# Patient Record
Sex: MALE | Race: WHITE | Employment: FULL TIME | URBAN - METROPOLITAN AREA
[De-identification: names, ages, dates, MRNs, and addresses within clinical notes are randomized per-mention and may not be internally consistent; named-entity substitution may affect disease eponyms.]

---

## 2017-01-26 ENCOUNTER — HOSPITAL ENCOUNTER (OUTPATIENT)
Dept: LAB | Age: 42
Discharge: HOME | End: 2017-01-26

## 2017-01-26 ENCOUNTER — LAB ONLY (OUTPATIENT)
Dept: INTERNAL MEDICINE CLINIC | Age: 42
End: 2017-01-26

## 2017-01-26 DIAGNOSIS — Z00.00 ROUTINE GENERAL MEDICAL EXAMINATION AT A HEALTH CARE FACILITY: Primary | ICD-10-CM

## 2017-01-26 PROCEDURE — 99001 SPECIMEN HANDLING PT-LAB: CPT | Performed by: INTERNAL MEDICINE

## 2017-01-27 LAB
ALBUMIN SERPL-MCNC: 4.4 G/DL (ref 3.5–5.5)
ALBUMIN/GLOB SERPL: 1.6 {RATIO} (ref 1.1–2.5)
ALP SERPL-CCNC: 78 IU/L (ref 39–117)
ALT SERPL-CCNC: 13 IU/L (ref 0–44)
AST SERPL-CCNC: 18 IU/L (ref 0–40)
BASOPHILS # BLD AUTO: 0 X10E3/UL (ref 0–0.2)
BASOPHILS NFR BLD AUTO: 0 %
BILIRUB SERPL-MCNC: 0.3 MG/DL (ref 0–1.2)
BUN SERPL-MCNC: 14 MG/DL (ref 6–20)
BUN/CREAT SERPL: 19 (ref 8–19)
CALCIUM SERPL-MCNC: 9.5 MG/DL (ref 8.7–10.2)
CHLORIDE SERPL-SCNC: 101 MMOL/L (ref 96–106)
CHOLEST SERPL-MCNC: 210 MG/DL (ref 100–199)
CO2 SERPL-SCNC: 25 MMOL/L (ref 18–29)
CREAT SERPL-MCNC: 0.75 MG/DL (ref 0.76–1.27)
EOSINOPHIL # BLD AUTO: 0.3 X10E3/UL (ref 0–0.4)
EOSINOPHIL NFR BLD AUTO: 5 %
ERYTHROCYTE [DISTWIDTH] IN BLOOD BY AUTOMATED COUNT: 13.4 % (ref 12.3–15.4)
GLOBULIN SER CALC-MCNC: 2.7 G/DL (ref 1.5–4.5)
GLUCOSE SERPL-MCNC: 93 MG/DL (ref 65–99)
HCT VFR BLD AUTO: 42.7 % (ref 37.5–51)
HDLC SERPL-MCNC: 45 MG/DL
HGB BLD-MCNC: 14.3 G/DL (ref 12.6–17.7)
IMM GRANULOCYTES # BLD: 0 X10E3/UL (ref 0–0.1)
IMM GRANULOCYTES NFR BLD: 0 %
INTERPRETATION, 910389: NORMAL
LDLC SERPL CALC-MCNC: 134 MG/DL (ref 0–99)
LYMPHOCYTES # BLD AUTO: 1.9 X10E3/UL (ref 0.7–3.1)
LYMPHOCYTES NFR BLD AUTO: 33 %
MCH RBC QN AUTO: 29.5 PG (ref 26.6–33)
MCHC RBC AUTO-ENTMCNC: 33.5 G/DL (ref 31.5–35.7)
MCV RBC AUTO: 88 FL (ref 79–97)
MONOCYTES # BLD AUTO: 0.6 X10E3/UL (ref 0.1–0.9)
MONOCYTES NFR BLD AUTO: 10 %
NEUTROPHILS # BLD AUTO: 3 X10E3/UL (ref 1.4–7)
NEUTROPHILS NFR BLD AUTO: 52 %
PLATELET # BLD AUTO: 259 X10E3/UL (ref 150–379)
POTASSIUM SERPL-SCNC: 4.4 MMOL/L (ref 3.5–5.2)
PROT SERPL-MCNC: 7.1 G/DL (ref 6–8.5)
RBC # BLD AUTO: 4.84 X10E6/UL (ref 4.14–5.8)
SODIUM SERPL-SCNC: 140 MMOL/L (ref 134–144)
TRIGL SERPL-MCNC: 155 MG/DL (ref 0–149)
TSH SERPL DL<=0.005 MIU/L-ACNC: 2.06 UIU/ML (ref 0.45–4.5)
VLDLC SERPL CALC-MCNC: 31 MG/DL (ref 5–40)
WBC # BLD AUTO: 5.8 X10E3/UL (ref 3.4–10.8)

## 2017-02-02 NOTE — PROGRESS NOTES
46 y.o. white male who presents for RPE    He seems to be doing very well. The prozac continues to control his sx    No cardiovascular complaints. Not exercising much mainly due to busy life although they did join the [facility] a few months back    No gi or gu issues. ppi is controlling the sx    Past Medical History   Diagnosis Date    Allergic rhinitis     Anxiety and depression     Cervical spine disease 2009     Dr. Afia Haley 2009; MRI showed C5-7 disc extrusion/spur complex w mild spinal stenosis    FHx: colon cancer     GERD with esophagitis 01/2015     Dr Samuel Jarvis EGD    History of echocardiogram 04/06/2012     Tech difficult. EF 60-65%. No RWMA. RVSP 15-20 mmHg.  Hyperlipidemia     Reactive hypoglycemia      5/14 3 hr GTT 40    Treadmill stress test  07/08/2011     Negative maximal stress test.  Ex time 13:00.      Past Surgical History   Procedure Laterality Date    Hx gi  11/10     neg ct abd/pelvis    Hx appendectomy  1/14     Dr Deepika Stephen    Pr cardiac surg procedure unlist  2014     tilt table negative Dr Anton Greenberg Pr cardiac surg procedure unlist  5/14     stress echo negative    Pr neurological procedure unlisted  8/14     EEG negative Dr Fatemeh Aguiar Pr neurological procedure unlisted  11/13     CT head negative    Hx colonoscopy       Dr Samuel Jarvis 1/9/15 negative     Social History     Social History    Marital status:      Spouse name: N/A    Number of children: 3    Years of education: N/A     Occupational History    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[address] [employer]     Social History Main Topics    Smoking status: Never Smoker    Smokeless tobacco: Never Used    Alcohol use 4.0 oz/week     8 Cans of beer per week      Comment: 5-6 per week    Drug use: No    Sexual activity: Yes     Partners: Female     Other Topics Concern    Not on file     Social History Narrative     Family History   Problem Relation Age of Onset    Other Mother      lupus    Diabetes Father     Cancer Father 67     prostate cancer    Cancer Maternal Uncle      lung    Heart Disease Maternal Grandfather     Cancer Maternal Uncle      brain tumor     Immunization History   Administered Date(s) Administered    Influenza Vaccine (Quad) PF 09/26/2016    Influenza Vaccine PF 10/14/2013     Current Outpatient Prescriptions   Medication Sig    diazepam (VALIUM) 10 mg tablet Take 1 Tab by mouth every six (6) hours as needed for Anxiety. Max Daily Amount: 40 mg.    ondansetron (ZOFRAN ODT) 8 mg disintegrating tablet Take 1 Tab by mouth every eight (8) hours as needed for Nausea.  FLUoxetine (PROZAC) 20 mg capsule TAKE ONE CAPSULE BY MOUTH DAILY    omeprazole (PRILOSEC) 20 mg capsule Take 1 Cap by mouth daily.  EPINEPHrine (EPIPEN 2-MARY) 0.3 mg/0.3 mL (1:1,000) injection 0.3 mL by IntraMUSCular route once as needed for up to 1 dose. No current facility-administered medications for this visit.       Allergies   Allergen Reactions    [place] Anaphylaxis    Avelox [Moxifloxacin] Other (comments)     Felt bad    Cat Dander Itching    Dog Dander Itching     REVIEW OF SYSTEMS: colo 1/15 Dr Holloway [place]  Ophtho - no vision change or eye pain  Oral - no mouth pain, tongue or tooth problems  Ears - no hearing loss, ear pain, fullness, no swallowing problems  Cardiac - no CP, PND, orthopnea, edema, palpitations or syncope  Chest - no breast masses  Resp - no wheezing, chronic coughing, dyspnea  GI - no heartburn, nausea, vomiting, change in bowel habits, bleeding, hemorrhoids  Urinary - no dysuria, hematuria, flank pain, urgency, frequency  Genitals - no genital lesions, discharge, masses, ulceration, warts  Ortho - no swelling, dec ROM, myalgias  Derm - no nail abnormalities, rashes, lesions of note, hair loss  Psych - denies any anxiety or depression symptoms, no hallucinations or violent ideation  Constitutional - no wt loss, night sweats, unexplained fevers  Neuro - no focal weakness, numbness, paresthesias, incoordination, ataxia, involuntary movements  Endo - no polyuria, polydipsia, nocturia, hot flashes    Visit Vitals    /78    Pulse 73    Temp 98.3 °F (36.8 °C) (Oral)    Ht 5' 8\" (1.727 m)    Wt 166 lb (75.3 kg)    SpO2 98%    BMI 25.24 kg/m2     Affect is appropriate. Mood stable  No apparent distress  HEENT --Anicteric sclerae, tympanic membranes normal,  ear canals normal.  PERRL, EOMI, conjunctiva and lids normal.  Disks were sharp  Sinuses were nontender, turbinates normal, hearing normal.  Oropharynx without  erythema, normal tongue, oral mucosa and tonsils. No thyromegaly, JVD, or bruits. Lungs --Clear to auscultation, normal percussion. Heart --Regular rate and rhythm, no murmurs, rubs, gallops, or clicks. Chest wall --Nontender to palpation. PMI normal.  Abdomen -- Soft and nontender, no hepatosplenomegaly or masses. Extremities -- Without cyanosis, clubbing, edema. 2+ pulses equally and bilaterally.     LABS  From 11/10 showed gluc 79, cr 0.90, gfr>60,   alt 27,              wbc 7.4, hb 14.9, plt 212, ua neg, tsh 1.38, uds neg  From 3/12 showed   gluc 94, cr 0.77, gfr 118,    chol 163, tg 60,   hdl 49, ldl-c 102, wbc 4.1, hb 14.2, plt 206, ua neg  From 7/13 showed   gluc 93, cr 0.85, gfr 113,  alt 15, chol 170, tg 112, hdl 46, ldl-c 102,        ua neg  From 11/13 showed                  ck/trop neg  From 1/14 showed   gluc 131, cr 0.91, gfr>60,               wbc 5.6, hb 11.9, plt 138, ua neg  From 8/14 showed                   tsh 1.37, lyme neg  From 11/14 showed gluc 93,   cr 0.65, gfr>60, alt 10, chol 172, tg 62,   hdl 49, ldl-c 111, wbc 5.0, hb 14.6, plt 216    Results for orders placed or performed in visit on 01/26/17   CBC WITH AUTOMATED DIFF   Result Value Ref Range    WBC 5.8 3.4 - 10.8 x10E3/uL    RBC 4.84 4.14 - 5.80 x10E6/uL    HGB 14.3 12.6 - 17.7 g/dL    HCT 42.7 37.5 - 51.0 %    MCV 88 79 - 97 fL    MCH 29.5 26.6 - 33.0 pg MCHC 33.5 31.5 - 35.7 g/dL    RDW 13.4 12.3 - 15.4 %    PLATELET 531 919 - 452 x10E3/uL    NEUTROPHILS 52 %    Lymphocytes 33 %    MONOCYTES 10 %    EOSINOPHILS 5 %    BASOPHILS 0 %    ABS. NEUTROPHILS 3.0 1.4 - 7.0 x10E3/uL    Abs Lymphocytes 1.9 0.7 - 3.1 x10E3/uL    ABS. MONOCYTES 0.6 0.1 - 0.9 x10E3/uL    ABS. EOSINOPHILS 0.3 0.0 - 0.4 x10E3/uL    ABS. BASOPHILS 0.0 0.0 - 0.2 x10E3/uL    IMMATURE GRANULOCYTES 0 %    ABS. IMM. GRANS. 0.0 0.0 - 0.1 x10E3/uL   LIPID PANEL   Result Value Ref Range    Cholesterol, total 210 (H) 100 - 199 mg/dL    Triglyceride 155 (H) 0 - 149 mg/dL    HDL Cholesterol 45 >39 mg/dL    VLDL, calculated 31 5 - 40 mg/dL    LDL, calculated 134 (H) 0 - 99 mg/dL   METABOLIC PANEL, COMPREHENSIVE   Result Value Ref Range    Glucose 93 65 - 99 mg/dL    BUN 14 6 - 20 mg/dL    Creatinine 0.75 (L) 0.76 - 1.27 mg/dL    GFR est non- >59 mL/min/1.73    GFR est  >59 mL/min/1.73    BUN/Creatinine ratio 19 8 - 19    Sodium 140 134 - 144 mmol/L    Potassium 4.4 3.5 - 5.2 mmol/L    Chloride 101 96 - 106 mmol/L    CO2 25 18 - 29 mmol/L    Calcium 9.5 8.7 - 10.2 mg/dL    Protein, total 7.1 6.0 - 8.5 g/dL    Albumin 4.4 3.5 - 5.5 g/dL    GLOBULIN, TOTAL 2.7 1.5 - 4.5 g/dL    A-G Ratio 1.6 1.1 - 2.5    Bilirubin, total 0.3 0.0 - 1.2 mg/dL    Alk. phosphatase 78 39 - 117 IU/L    AST (SGOT) 18 0 - 40 IU/L    ALT (SGPT) 13 0 - 44 IU/L   TSH 3RD GENERATION   Result Value Ref Range    TSH 2.060 0.450 - 4.500 uIU/mL   CVD REPORT   Result Value Ref Range    INTERPRETATION Note      Patient Active Problem List   Diagnosis Code    Anxiety F41.9    Hyperlipidemia E78.5    GERD with esophagitis Dr Vandana Epperson K21.0     Assessment and plan:  1. GERD. PPI and avoidance measures, EGD per GI  2. Psychiatric. Continjue prozac  3. FHx colo ca. Colonoscopy 2020  4. HLP.   Lifestyle and dietary measures, we gave the mediterranean diet sheet        RTC 2/18    Above conditions discussed at length and patient vocalized understanding.   All questions answered to patient satifaction

## 2017-02-06 ENCOUNTER — OFFICE VISIT (OUTPATIENT)
Dept: INTERNAL MEDICINE CLINIC | Age: 42
End: 2017-02-06

## 2017-02-06 VITALS
OXYGEN SATURATION: 98 % | HEIGHT: 68 IN | BODY MASS INDEX: 25.16 KG/M2 | HEART RATE: 73 BPM | TEMPERATURE: 98.3 F | SYSTOLIC BLOOD PRESSURE: 116 MMHG | WEIGHT: 166 LBS | DIASTOLIC BLOOD PRESSURE: 78 MMHG

## 2017-02-06 DIAGNOSIS — F41.9 ANXIETY: ICD-10-CM

## 2017-02-06 DIAGNOSIS — Z00.00 PHYSICAL EXAM: Primary | ICD-10-CM

## 2017-02-06 DIAGNOSIS — K21.00 GERD WITH ESOPHAGITIS: ICD-10-CM

## 2017-02-06 DIAGNOSIS — E78.5 HYPERLIPIDEMIA, UNSPECIFIED HYPERLIPIDEMIA TYPE: ICD-10-CM

## 2017-02-06 NOTE — PROGRESS NOTES
1. Have you been to the ER, urgent care clinic or hospitalized since your last visit? NO.     2. Have you seen or consulted any other health care providers outside of the [address] [place] [address] since your last visit (Include any pap smears or colon screening)? NO      Do you have an Advanced Directive? NO    Would you like information on Advanced Directives?  YES

## 2017-02-06 NOTE — MR AVS SNAPSHOT
Visit Information Date & Time Provider Department Dept. Phone Encounter #  
 2/6/2017  2:00 PM Gagan Deutsch MD [facility] [address] [place] [address] Road [phone] [id number] Upcoming Health Maintenance Date Due DTaP/Tdap/Td series (1 - Tdap) 8/25/1998 COLONOSCOPY 1/9/2020 Allergies as of 2/6/2017  Review Complete On: 9/23/2016 By: Gagan Deutsch MD  
  
 Severity Noted Reaction Type Reactions [place] High 02/13/2013    Anaphylaxis Avelox [Moxifloxacin]    Other (comments) Felt bad Cat Dander  07/30/2014    Itching Dog Dander  07/30/2014    Itching Current Immunizations  Reviewed on 12/30/2014 Name Date Influenza Vaccine (Quad) PF 9/26/2016 Influenza Vaccine PF 10/14/2013 Not reviewed this visit Vitals BP Pulse Temp Height(growth percentile) Weight(growth percentile) SpO2  
 116/78 73 98.3 °F (36.8 °C) (Oral) 5' 8\" (1.727 m) 166 lb (75.3 kg) 98% BMI Smoking Status 25.24 kg/m2 Never Smoker Vitals History BMI and BSA Data Body Mass Index Body Surface Area  
 25.24 kg/m 2 1.9 m 2 Preferred Pharmacy Pharmacy Name Phone [facility] [address] [facility] [phone] Your Updated Medication List  
  
   
This list is accurate as of: 2/6/17  2:43 PM.  Always use your most recent med list.  
  
  
  
  
 diazePAM 10 mg tablet Commonly known as:  VALIUM Take 1 Tab by mouth every six (6) hours as needed for Anxiety. Max Daily Amount: 40 mg. EPINEPHrine 0.3 mg/0.3 mL injection Commonly known as:  EPIPEN 2-MARY  
0.3 mL by IntraMUSCular route once as needed for up to 1 dose. FLUoxetine 20 mg capsule Commonly known as:  PROzac TAKE ONE CAPSULE BY MOUTH DAILY  
  
 omeprazole 20 mg capsule Commonly known as:  PRILOSEC Take 1 Cap by mouth daily. ondansetron 8 mg disintegrating tablet Commonly known as:  ZOFRAN ODT  
 Take 1 Tab by mouth every eight (8) hours as needed for Nausea. Introducing [facility] & HEALTH SERVICES! Dear Damion Jack: Thank you for requesting a Truecaller account. Our records indicate that you have previously registered for a Truecaller account but its currently inactive. Please call our Truecaller support line at [phone]. Additional Information If you have questions, please visit the Frequently Asked Questions section of the Truecaller website at [email or web address] Remember, Truecaller is NOT to be used for urgent needs. For medical emergencies, dial 911. Now available from your iPhone and Android! Please provide this summary of care documentation to your next provider. Your primary care clinician is listed as Janet Villanueva. If you have any questions after today's visit, please call [phone].

## 2017-04-18 RX ORDER — OMEPRAZOLE 20 MG/1
CAPSULE, DELAYED RELEASE ORAL
Qty: 30 CAP | Refills: 2 | Status: SHIPPED | OUTPATIENT
Start: 2017-04-18 | End: 2018-06-25 | Stop reason: SDUPTHER

## 2017-05-23 RX ORDER — FLUOXETINE HYDROCHLORIDE 20 MG/1
CAPSULE ORAL
Qty: 30 CAP | Refills: 12 | Status: SHIPPED | OUTPATIENT
Start: 2017-05-23 | End: 2018-06-04 | Stop reason: SDUPTHER

## 2017-07-13 RX ORDER — EPINEPHRINE 0.3 MG/.3ML
0.3 INJECTION SUBCUTANEOUS
Qty: 1 ML | Refills: 2 | Status: SHIPPED | OUTPATIENT
Start: 2017-07-13 | End: 2020-06-11

## 2017-08-06 DIAGNOSIS — Z00.00 PHYSICAL EXAM: ICD-10-CM

## 2017-12-08 ENCOUNTER — OFFICE VISIT (OUTPATIENT)
Dept: INTERNAL MEDICINE CLINIC | Age: 42
End: 2017-12-08

## 2017-12-08 VITALS
SYSTOLIC BLOOD PRESSURE: 102 MMHG | BODY MASS INDEX: 24.74 KG/M2 | OXYGEN SATURATION: 99 % | HEIGHT: 68 IN | DIASTOLIC BLOOD PRESSURE: 70 MMHG | HEART RATE: 75 BPM | TEMPERATURE: 99.1 F | WEIGHT: 163.2 LBS

## 2017-12-08 DIAGNOSIS — E78.5 HYPERLIPIDEMIA, UNSPECIFIED HYPERLIPIDEMIA TYPE: ICD-10-CM

## 2017-12-08 NOTE — PROGRESS NOTES
1. Have you been to the ER, urgent care clinic or hospitalized since your last visit? NO.     2. Have you seen or consulted any other health care providers outside of the [address] [place] [address] since your last visit (Include any pap smears or colon screening)? NO      Do you have an Advanced Directive? NO    Would you like information on Advanced Directives?  yes

## 2017-12-08 NOTE — PROGRESS NOTES
Pt asked by [facility] to come in for f/u chol. Did not get labs done  Not officially seen.   Will reschedule for spring

## 2018-03-01 NOTE — PROGRESS NOTES
38 y.o. white male who presents for RPE    The prozac continues to control his sx    No cardiovascular complaints. They briefly joined the [facility] but never went so canceled the membership. He does occasionally exercise at home and runs    No gi or gu issues. He came off the ppi routinely and just using prn, does the apple cider vinegar    Past Medical History:   Diagnosis Date    Allergic rhinitis     Anxiety and depression     Cervical spine disease 2009    Dr. Daniel Pineda 2009; MRI showed C5-7 disc extrusion/spur complex w mild spinal stenosis    FHx: colon cancer     GERD with esophagitis 01/2015    Dr Reena Butt EGD    History of echocardiogram 04/06/2012    Tech difficult. EF 60-65%. No RWMA. RVSP 15-20 mmHg.  Hyperlipidemia     Reactive hypoglycemia     5/14 3 hr GTT 40    Treadmill stress test  07/08/2011    Negative maximal stress test.  Ex time 13:00.      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[address] [employer]     Social History Main Topics    Smoking status: Never Smoker    Smokeless tobacco: Never Used    Alcohol use 4.0 oz/week     8 Cans of beer per week      Comment: 5-6 per week    Drug use: No    Sexual activity: Yes     Partners: Female     Other Topics Concern    Not on file     Social History Narrative     Family History   Problem Relation Age of Onset    Other Mother      lupus    Diabetes Father     Cancer Father 67     prostate cancer    Cancer Maternal Uncle      lung    Heart Disease Maternal Grandfather     Cancer Maternal Uncle      brain tumor     Immunization History   Administered Date(s) Administered    Influenza Vaccine (Quad) PF 09/26/2016    Influenza Vaccine PF 10/14/2013     Current Outpatient Prescriptions   Medication Sig    EPINEPHrine (EPIPEN 2-MARY) 0.3 mg/0.3 mL injection 0.3 mL by IntraMUSCular route once as needed for up to 1 dose.  FLUoxetine (PROZAC) 20 mg capsule TAKE ONE CAPSULE BY MOUTH DAILY    omeprazole (PRILOSEC) 20 mg capsule TAKE 1 CAPSULE BY MOUTH DAILY    diazepam (VALIUM) 10 mg tablet Take 1 Tab by mouth every six (6) hours as needed for Anxiety. Max Daily Amount: 40 mg. No current facility-administered medications for this visit.       Allergies   Allergen Reactions    [place] Anaphylaxis    Avelox [Moxifloxacin] Other (comments)     Felt bad    Cat Dander Itching    Dog Dander Itching     REVIEW OF SYSTEMS: colo 1/15 Dr Renny Iqbal  Ophtho  no vision change or eye pain  Oral  no mouth pain, tongue or tooth problems  Ears  no hearing loss, ear pain, fullness, no swallowing problems  Cardiac  no CP, PND, orthopnea, edema, palpitations or syncope  Chest  no breast masses  Resp  no wheezing, chronic coughing, dyspnea  GI  no heartburn, nausea, vomiting, change in bowel habits, bleeding, hemorrhoids  Urinary  no dysuria, hematuria, flank pain, urgency, frequency  Genitals  no genital lesions, discharge, masses, ulceration, warts  Ortho  no swelling, dec ROM, myalgias  Derm  no nail abnormalities, rashes, lesions of note, hair loss  Psych  denies any anxiety or depression symptoms, no hallucinations or violent ideation  Constitutional  no wt loss, night sweats, unexplained fevers  Neuro  no focal weakness, numbness, paresthesias, incoordination, ataxia, involuntary movements  Endo - no polyuria, polydipsia, nocturia, hot flashes    Visit Vitals    /70    Pulse 72    Temp 98.2 °F (36.8 °C) (Oral)    Resp 14    Ht 5' 8\" (1.727 m)    Wt 159 lb (72.1 kg)    SpO2 98%    BMI 24.18 kg/m2     Affect is appropriate. Mood stable  No apparent distress  HEENT --Anicteric sclerae, tympanic membranes normal,  ear canals normal.  PERRL, EOMI, conjunctiva and lids normal.  Disks were sharp  Sinuses were nontender, turbinates normal, hearing normal.  Oropharynx without  erythema, normal tongue, oral mucosa and tonsils. No thyromegaly, JVD, or bruits. Lungs --Clear to auscultation, normal percussion. Heart --Regular rate and rhythm, no murmurs, rubs, gallops, or clicks. Chest wall --Nontender to palpation. PMI normal.  Abdomen -- Soft and nontender, no hepatosplenomegaly or masses. Extremities -- Without cyanosis, clubbing, edema. 2+ pulses equally and bilaterally.     LABS  From 11/10 showed gluc 79, cr 0.90, gfr>60,    alt 27,               wbc 7.4, hb 14.9, plt 212, ua neg, tsh 1.38, uds neg  From 3/12 showed   gluc 94, cr 0.77, gfr 118,     chol 163, tg 60,   hdl 49, ldl-c 102, wbc 4.1, hb 14.2, plt 206, ua neg  From 7/13 showed   gluc 93, cr 0.85, gfr 113,   alt 15, chol 170, tg 112, hdl 46, ldl-c 102,         ua neg  From 11/13 showed                   ck/trop neg  From 1/14 showed   gluc 131, cr 0.91, gfr>60,                wbc 5.6, hb 11.9, plt 138, ua neg  From 8/14 showed                   tsh 1.37, lyme neg  From 11/14 showed gluc 93,   cr 0.65, gfr>60,  alt 10, chol 172, tg 62,   hdl 49, ldl-c 111, wbc 5.0, hb 14.6, plt 216  From 2/17 showed   gluc 93,   cr 0.75, gfr 116, alt 13, chol 210, tg 155, hdl 45, ldl-c 134, wbc 5.8, hb 14.3, plt 259, tsh 2.06    Results for orders placed or performed in visit on 30/98/66   METABOLIC PANEL, COMPREHENSIVE   Result Value Ref Range    Glucose 93 65 - 99 mg/dL    BUN 14 6 - 24 mg/dL    Creatinine 0.84 0.76 - 1.27 mg/dL    GFR est non- >59 mL/min/1.73    GFR est  >59 mL/min/1.73    BUN/Creatinine ratio 17 9 - 20    Sodium 137 134 - 144 mmol/L    Potassium 4.7 3.5 - 5.2 mmol/L    Chloride 97 96 - 106 mmol/L    CO2 23 18 - 29 mmol/L    Calcium 9.3 8.7 - 10.2 mg/dL    Protein, total 6.7 6.0 - 8.5 g/dL    Albumin 4.4 3.5 - 5.5 g/dL    GLOBULIN, TOTAL 2.3 1.5 - 4.5 g/dL    A-G Ratio 1.9 1.2 - 2.2    Bilirubin, total 0.5 0.0 - 1.2 mg/dL    Alk. phosphatase 73 39 - 117 IU/L    AST (SGOT) 13 0 - 40 IU/L    ALT (SGPT) 7 0 - 44 IU/L   LIPID PANEL   Result Value Ref Range    Cholesterol, total 186 100 - 199 mg/dL    Triglyceride 92 0 - 149 mg/dL    HDL Cholesterol 47 >39 mg/dL    VLDL, calculated 18 5 - 40 mg/dL    LDL, calculated 121 (H) 0 - 99 mg/dL   CVD REPORT   Result Value Ref Range    INTERPRETATION Note      Patient Active Problem List   Diagnosis Code    Anxiety F41.9    Hyperlipidemia E78.5    GERD with esophagitis Dr rOtiz Alicia K21.0     Assessment and plan:  1. GERD. PPI and avoidance measures  2. Psychiatric. Continjue prozac  3. FHx colo ca. Colonoscopy 2020  4. HLP. Lifestyle and dietary measures, we gave the mediterranean diet sheet        RTC 3/19    Above conditions discussed at length and patient vocalized understanding.   All questions answered to patient satifaction

## 2018-03-03 ENCOUNTER — HOSPITAL ENCOUNTER (OUTPATIENT)
Dept: LAB | Age: 43
Discharge: HOME | End: 2018-03-03

## 2018-03-03 PROCEDURE — 99001 SPECIMEN HANDLING PT-LAB: CPT | Performed by: INTERNAL MEDICINE

## 2018-03-05 LAB
ALBUMIN SERPL-MCNC: 4.4 G/DL (ref 3.5–5.5)
ALBUMIN/GLOB SERPL: 1.9 {RATIO} (ref 1.2–2.2)
ALP SERPL-CCNC: 73 IU/L (ref 39–117)
ALT SERPL-CCNC: 7 IU/L (ref 0–44)
AST SERPL-CCNC: 13 IU/L (ref 0–40)
BILIRUB SERPL-MCNC: 0.5 MG/DL (ref 0–1.2)
BUN SERPL-MCNC: 14 MG/DL (ref 6–24)
BUN/CREAT SERPL: 17 (ref 9–20)
CALCIUM SERPL-MCNC: 9.3 MG/DL (ref 8.7–10.2)
CHLORIDE SERPL-SCNC: 97 MMOL/L (ref 96–106)
CHOLEST SERPL-MCNC: 186 MG/DL (ref 100–199)
CO2 SERPL-SCNC: 23 MMOL/L (ref 18–29)
CREAT SERPL-MCNC: 0.84 MG/DL (ref 0.76–1.27)
GFR SERPLBLD CREATININE-BSD FMLA CKD-EPI: 110 ML/MIN/1.73
GFR SERPLBLD CREATININE-BSD FMLA CKD-EPI: 127 ML/MIN/1.73
GLOBULIN SER CALC-MCNC: 2.3 G/DL (ref 1.5–4.5)
GLUCOSE SERPL-MCNC: 93 MG/DL (ref 65–99)
HDLC SERPL-MCNC: 47 MG/DL
INTERPRETATION, 910389: NORMAL
LDLC SERPL CALC-MCNC: 121 MG/DL (ref 0–99)
POTASSIUM SERPL-SCNC: 4.7 MMOL/L (ref 3.5–5.2)
PROT SERPL-MCNC: 6.7 G/DL (ref 6–8.5)
SODIUM SERPL-SCNC: 137 MMOL/L (ref 134–144)
TRIGL SERPL-MCNC: 92 MG/DL (ref 0–149)
VLDLC SERPL CALC-MCNC: 18 MG/DL (ref 5–40)

## 2018-03-13 ENCOUNTER — OFFICE VISIT (OUTPATIENT)
Dept: INTERNAL MEDICINE CLINIC | Age: 43
End: 2018-03-13

## 2018-03-13 VITALS
SYSTOLIC BLOOD PRESSURE: 102 MMHG | HEIGHT: 68 IN | WEIGHT: 159 LBS | RESPIRATION RATE: 14 BRPM | TEMPERATURE: 98.2 F | HEART RATE: 72 BPM | BODY MASS INDEX: 24.1 KG/M2 | OXYGEN SATURATION: 98 % | DIASTOLIC BLOOD PRESSURE: 70 MMHG

## 2018-03-13 DIAGNOSIS — Z00.00 PHYSICAL EXAM: Primary | ICD-10-CM

## 2018-03-13 DIAGNOSIS — K21.00 GERD WITH ESOPHAGITIS: ICD-10-CM

## 2018-03-13 DIAGNOSIS — F41.9 ANXIETY: ICD-10-CM

## 2018-03-13 DIAGNOSIS — E78.5 HYPERLIPIDEMIA, UNSPECIFIED HYPERLIPIDEMIA TYPE: ICD-10-CM

## 2018-03-13 NOTE — PROGRESS NOTES
1. Have you been to the ER, urgent care clinic or hospitalized since your last visit? NO.     2. Have you seen or consulted any other health care providers outside of the [address] [place] [address] since your last visit (Include any pap smears or colon screening)? NO      Do you have an Advanced Directive? NO    Would you like information on Advanced Directives?  NO

## 2018-03-13 NOTE — MR AVS SNAPSHOT
[address] [facility] 
 
 
 [address], [address] [facility] 
[phone] Patient: Lenny Strickland MRN: [id number] Speedy Little Visit Information Date & Time Provider Department Dept. Phone Encounter #  
 3/13/2018  8:40 AM Elizabeth Bell MD [facility] [phone] Your Appointments 3/18/2019  8:20 AM  
PHYSICAL with Elizabeth Bell MD  
[facility]) Appt Note: physical  
 [address] [facility] [address] [address] [place]  
  
   
 [address], [address] Upcoming Health Maintenance Date Due Influenza Age 5 to Adult 8/1/2017 COLONOSCOPY 1/9/2020 DTaP/Tdap/Td series (2 - Td) 2/6/2027 Allergies as of 3/13/2018  Review Complete On: 3/13/2018 By: Milagro Coronado Severity Noted Reaction Type Reactions [place] High 02/13/2013    Anaphylaxis Avelox [Moxifloxacin]    Other (comments) Felt bad Cat Dander  07/30/2014    Itching Dog Dander  07/30/2014    Itching Current Immunizations  Reviewed on 12/30/2014 Name Date Influenza Vaccine (Quad) PF 9/26/2016 Influenza Vaccine PF 10/14/2013 Not reviewed this visit Vitals BP Pulse Temp Resp Height(growth percentile) Weight(growth percentile) 102/70 72 98.2 °F (36.8 °C) (Oral) 14 5' 8\" (1.727 m) 159 lb (72.1 kg) SpO2 BMI Smoking Status 98% 24.18 kg/m2 Never Smoker Vitals History BMI and BSA Data Body Mass Index Body Surface Area  
 24.18 kg/m 2 1.86 m 2 Preferred Pharmacy Pharmacy Name Phone [facility] [address] [phone] Your Updated Medication List  
  
   
This list is accurate as of 3/13/18  9:35 AM.  Always use your most recent med list.  
  
  
  
  
 diazePAM 10 mg tablet Commonly known as:  VALIUM  
 Take 1 Tab by mouth every six (6) hours as needed for Anxiety. Max Daily Amount: 40 mg. EPINEPHrine 0.3 mg/0.3 mL injection Commonly known as:  EPIPEN 2-MARY  
0.3 mL by IntraMUSCular route once as needed for up to 1 dose. FLUoxetine 20 mg capsule Commonly known as:  PROzac TAKE ONE CAPSULE BY MOUTH DAILY  
  
 omeprazole 20 mg capsule Commonly known as:  PRILOSEC  
TAKE 1 CAPSULE BY MOUTH DAILY  
  
 ondansetron 8 mg disintegrating tablet Commonly known as:  ZOFRAN ODT Take 1 Tab by mouth every eight (8) hours as needed for Nausea. Introducing [facility] SERVICES! Dear Beryle Akin: Thank you for requesting a milog account. Our records indicate that you already have an active milog account. You can access your account anytime at [email or web address]. "[email or web address]/[email or web address] book Did you know that you can access your hospital and ER discharge instructions at any time in milog? You can also review all of your test results from your hospital stay or ER visit. Additional Information If you have questions, please visit the Frequently Asked Questions section of the milog website at [email or web address] "[email or web address]/. Remember, milog is NOT to be used for urgent needs. For medical emergencies, dial 911. Now available from your iPhone and Android! Please provide this summary of care documentation to your next provider. Your primary care clinician is listed as Radha Finley. If you have any questions after today's visit, please call [phone].

## 2018-04-19 ENCOUNTER — TELEPHONE (OUTPATIENT)
Dept: INTERNAL MEDICINE CLINIC | Age: 43
End: 2018-04-19

## 2018-04-19 NOTE — TELEPHONE ENCOUNTER
left message for pt to see why he is coming in in April. Per RD it was on his schedule as a pe but he had a pe in March. I changed to ov in case he is still needing appt. Add reason for visit. If not needed please cancel to open spot for RD.

## 2018-06-04 RX ORDER — FLUOXETINE HYDROCHLORIDE 20 MG/1
CAPSULE ORAL
Qty: 30 CAP | Refills: 11 | Status: SHIPPED | OUTPATIENT
Start: 2018-06-04 | End: 2019-06-04 | Stop reason: SDUPTHER

## 2018-06-07 DIAGNOSIS — E78.5 HYPERLIPIDEMIA, UNSPECIFIED HYPERLIPIDEMIA TYPE: ICD-10-CM

## 2018-06-25 RX ORDER — OMEPRAZOLE 20 MG/1
CAPSULE, DELAYED RELEASE ORAL
Qty: 30 CAP | Refills: 2 | Status: SHIPPED | OUTPATIENT
Start: 2018-06-25 | End: 2018-07-05 | Stop reason: SDUPTHER

## 2018-07-05 RX ORDER — OMEPRAZOLE 20 MG/1
20 CAPSULE, DELAYED RELEASE ORAL DAILY
Qty: 90 CAP | Refills: 0 | Status: SHIPPED | OUTPATIENT
Start: 2018-07-05 | End: 2020-05-21 | Stop reason: SDUPTHER

## 2018-07-05 NOTE — TELEPHONE ENCOUNTER
Last Visit: 03/13/2018 with MD Osmar Gaytan    Next Appointment: 03/18/2018 with MD Osmar Gaytan   Previous Refill Encounters: 06/25/2018 per MD Osmar Gaytan #[phone] with 2 refills     Requested Prescriptions     Pending Prescriptions Disp Refills    omeprazole (PRILOSEC) 20 mg capsule 90 Cap 0     Sig: Take 1 Cap by mouth daily.

## 2018-07-13 RX ORDER — EPINEPHRINE 0.3 MG/.3ML
INJECTION SUBCUTANEOUS
Qty: 2 SYRINGE | Refills: 5 | Status: SHIPPED | OUTPATIENT
Start: 2018-07-13 | End: 2018-09-27 | Stop reason: SDUPTHER

## 2018-09-10 DIAGNOSIS — Z00.00 PHYSICAL EXAM: ICD-10-CM

## 2018-09-27 ENCOUNTER — OFFICE VISIT (OUTPATIENT)
Dept: INTERNAL MEDICINE CLINIC | Age: 43
End: 2018-09-27

## 2018-09-27 VITALS
TEMPERATURE: 98.2 F | HEIGHT: 68 IN | RESPIRATION RATE: 14 BRPM | WEIGHT: 160 LBS | SYSTOLIC BLOOD PRESSURE: 112 MMHG | DIASTOLIC BLOOD PRESSURE: 80 MMHG | BODY MASS INDEX: 24.25 KG/M2 | HEART RATE: 74 BPM | OXYGEN SATURATION: 98 %

## 2018-09-27 DIAGNOSIS — S89.90XA INJURY OF CALF: Primary | ICD-10-CM

## 2018-09-27 NOTE — MR AVS SNAPSHOT
[address] [facility] 
 
 
 [address], [facility] [address] [facility] 
[phone] Patient: Estefani Segovia MRN: [id number] Jhon Gaudencios Visit Information Date & Time Provider Department Dept. Phone Encounter #  
 9/27/2018  7:45 AM Laura Ortiz MD [facility] [phone] Your Appointments 3/18/2019  8:20 AM  
PHYSICAL with Laura Ortiz MD  
[facility]) Appt Note: physical  
 [address] [facility] [address] [address]  
  
   
 [address], [address] Upcoming Health Maintenance Date Due Influenza Age 5 to Adult 8/1/2018 COLONOSCOPY 1/9/2020 DTaP/Tdap/Td series (2 - Td) 2/6/2027 Allergies as of 9/27/2018  Review Complete On: 9/27/2018 By: Laura Ortiz MD  
  
 Severity Noted Reaction Type Reactions [place] 02/13/2013    Anaphylaxis Avelox [Moxifloxacin]    Other (comments) Felt bad Cat Dander  07/30/2014    Itching Dog Dander  07/30/2014    Itching Current Immunizations  Reviewed on 12/30/2014 Name Date Influenza Vaccine (Quad) PF 9/26/2016 Influenza Vaccine PF 10/14/2013 Not reviewed this visit Vitals BP Pulse Temp Resp Height(growth percentile) Weight(growth percentile) 112/80 74 98.2 °F (36.8 °C) (Oral) 14 5' 8\" (1.727 m) 160 lb (72.6 kg) SpO2 BMI Smoking Status 98% 24.33 kg/m2 Never Smoker Vitals History BMI and BSA Data Body Mass Index Body Surface Area  
 24.33 kg/m 2 1.87 m 2 Preferred Pharmacy Pharmacy Name Phone [address] [place] [address], [address] [place] [address]/ [place] [address] [phone] Your Updated Medication List  
  
   
This list is accurate as of 9/27/18  8:29 AM.  Always use your most recent med list.  
  
  
  
  
 diazePAM 10 mg tablet Commonly known as:  VALIUM  
 Take 1 Tab by mouth every six (6) hours as needed for Anxiety. Max Daily Amount: 40 mg. EPINEPHrine 0.3 mg/0.3 mL injection Commonly known as:  EPIPEN 2-MARY  
0.3 mL by IntraMUSCular route once as needed for up to 1 dose. FLUoxetine 20 mg capsule Commonly known as:  PROzac TAKE 1 CAPSULE BY MOUTH DAILY  
  
 omeprazole 20 mg capsule Commonly known as:  PRILOSEC Take 1 Cap by mouth daily. Introducing [facility] SERVICES! Dear Jose Ramon Mcneal: Thank you for requesting a [employer] account. Our records indicate that you already have an active [employer] account. You can access your account anytime at [email or web address] Did you know that you can access your hospital and ER discharge instructions at any time in [employer]? You can also review all of your test results from your hospital stay or ER visit. Additional Information If you have questions, please visit the Frequently Asked Questions section of the [employer] website at [email or web address] Remember, [employer] is NOT to be used for urgent needs. For medical emergencies, dial 911. Now available from your iPhone and Android! Please provide this summary of care documentation to your next provider. Your primary care clinician is listed as Josué Gabriel. If you have any questions after today's visit, please call [phone].

## 2018-09-27 NOTE — PROGRESS NOTES
41 y.o. white male who presents for evaluation. He was playing kickball with his son roughly 2 weeks ago and was chasing the ball, he planted the right leg and immediately 'felt a pop' in the middle/upper portion of the right calf. Started having pain, could not really walk and was hobbling around for about 2 days. He iced it down a couple days after the event. Function eventually came back and he was able to walk normally again, he never did find note swelling although the calf did feel tight for little bit. There was no bruising. No involvement of the knee joints or ankle joints. He ran across the street a week later and immediately felt a shooting pain in the calf and had to stop. Currently not able to do much exercise because of said shooting pain. Past Medical History:   Diagnosis Date    Allergic rhinitis     Anxiety and depression     Cervical spine disease 2009    Dr. Rossi Gave 2009; MRI showed C5-7 disc extrusion/spur complex w mild spinal stenosis    FHx: colon cancer     GERD with esophagitis 01/2015    Dr Rosado Night EGD    History of echocardiogram 04/06/2012    Tech difficult. EF 60-65%. No RWMA. RVSP 15-20 mmHg.  Hyperlipidemia     Reactive hypoglycemia     5/14 3 hr GTT 40    Treadmill stress test  07/08/2011    Negative maximal stress test.  Ex time 13:00. Current Outpatient Prescriptions   Medication Sig    omeprazole (PRILOSEC) 20 mg capsule Take 1 Cap by mouth daily.  FLUoxetine (PROZAC) 20 mg capsule TAKE 1 CAPSULE BY MOUTH DAILY    EPINEPHrine (EPIPEN 2-MARY) 0.3 mg/0.3 mL injection 0.3 mL by IntraMUSCular route once as needed for up to 1 dose.  diazepam (VALIUM) 10 mg tablet Take 1 Tab by mouth every six (6) hours as needed for Anxiety. Max Daily Amount: 40 mg. No current facility-administered medications for this visit.       Allergies   Allergen Reactions    [place] Anaphylaxis    Avelox [Moxifloxacin] Other (comments)     Felt bad    Cat Dander Itching    Dog Dander Itching     Visit Vitals    /80    Pulse 74    Temp 98.2 °F (36.8 °C) (Oral)    Resp 14    Ht 5' 8\" (1.727 m)    Wt 160 lb (72.6 kg)    SpO2 98%    BMI 24.33 kg/m2     Range of motion for the knee and ankle joints were normal.  Pulses 2+ DP and PT. Circumference of the upper and middle calf were roughly 1-1.5 cm more on the right side. There was mild tenderness on palpation of the middle upper/medial region of the calf musculature, no bruising or erythema noted. No tenderness in achilles region    Assessment and plan:  1. Calf muscle injury. Second opinion ortho to be obtained. Nsaid prn, no exercise outside of walking/adls until evaluated      Above conditions discussed at length and patient vocalized understanding.   All questions answered to patient satisfaction

## 2019-03-15 NOTE — PROGRESS NOTES
41 y.o. white male who presents for RPE    The prozac continues to control his sx    No cardiovascular complaints. Trying to be active. There's a 'traveling [email or web address] program that goes to the [email or web address] twice a week which he is trying to participate in. They do an hour of exercise for the staff apparently    No gi or gu issues. He is using ppi prn and doing avoidance measures    Past Medical History:   Diagnosis Date    Allergic rhinitis     Anxiety and depression     Cervical spine disease 2009    Dr. Reyna Brown 2009; MRI showed C5-7 disc extrusion/spur complex w mild spinal stenosis    FHx: colon cancer     GERD with esophagitis 01/2015    Dr Maggie Nichole EGD    History of echocardiogram 04/06/2012    Tech difficult. EF 60-65%. No RWMA. RVSP 15-20 mmHg.  Hyperlipidemia     Reactive hypoglycemia     5/14 3 hr GTT 40    Treadmill stress test  07/08/2011    Negative maximal stress test.  Ex time 13:00.      Past Surgical History:   Procedure Laterality Date    CARDIAC SURG PROCEDURE UNLIST  2014    tilt table negative Dr Saul Denson  5/14    stress echo negative    HX APPENDECTOMY  1/14    Dr Neema Nichole 1/9/15 negative    HX GI  11/10    neg ct abd/pelvis    NEUROLOGICAL PROCEDURE UNLISTED  8/14    EEG negative Dr Gisel Iraheta  11/13    CT head negative     Social History     Socioeconomic History    Marital status:      Spouse name: Not on file    Number of children: 3    Years of education: Not on file    Highest education level: Not on file   Social Needs    Financial resource strain: Not on file    Food insecurity - worry: Not on file    Food insecurity - inability: Not on file   [employer] needs - medical: Not on file   [employer] needs - non-medical: Not on file   Occupational History    Occupation: 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[employer]     Employer: [employer] Employer: [employer]   Tobacco Use    Smoking status: Never Smoker    Smokeless tobacco: Never Used   Substance and Sexual Activity    Alcohol use: Yes     Alcohol/week: 4.0 oz     Types: 8 Cans of beer per week     Comment: 5-6 per week    Drug use: No    Sexual activity: Yes     Partners: Female   Other Topics Concern    Not on file   Social History Narrative    Not on file     Family History   Problem Relation Age of Onset    Other Mother         lupus    Diabetes Father     Cancer Father 67        prostate cancer    Cancer Maternal Uncle         lung    Heart Disease Maternal Grandfather     Cancer Maternal Uncle         brain tumor     Immunization History   Administered Date(s) Administered    Influenza Vaccine (Quad) PF 09/26/2016    Influenza Vaccine PF 10/14/2013     Current Outpatient Medications   Medication Sig    omeprazole (PRILOSEC) 20 mg capsule Take 1 Cap by mouth daily. (Patient taking differently: Take 20 mg by mouth as needed.)    FLUoxetine (PROZAC) 20 mg capsule TAKE 1 CAPSULE BY MOUTH DAILY    EPINEPHrine (EPIPEN 2-MARY) 0.3 mg/0.3 mL injection 0.3 mL by IntraMUSCular route once as needed for up to 1 dose.  diazepam (VALIUM) 10 mg tablet Take 1 Tab by mouth every six (6) hours as needed for Anxiety. Max Daily Amount: 40 mg. No current facility-administered medications for this visit.       Allergies   Allergen Reactions    [place] Anaphylaxis    Avelox [Moxifloxacin] Other (comments)     Felt bad    Cat Dander Itching    Dog Dander Itching     REVIEW OF SYSTEMS: colo 1/15 Dr Serrano [place]  Ophtho  no vision change or eye pain  Oral  no mouth pain, tongue or tooth problems  Ears  no hearing loss, ear pain, fullness, no swallowing problems  Cardiac  no CP, PND, orthopnea, edema, palpitations or syncope  Chest  no breast masses  Resp  no wheezing, chronic coughing, dyspnea  GI  no heartburn, nausea, vomiting, change in bowel habits, bleeding, hemorrhoids  Urinary  no dysuria, hematuria, flank pain, urgency, frequency  Genitals  no genital lesions, discharge, masses, ulceration, warts  Ortho  no swelling, dec ROM, myalgias  Derm  no nail abnormalities, rashes, lesions of note, hair loss    Visit Vitals  /82   Pulse 69   Temp 98 °F (36.7 °C) (Oral)   Resp 14   Ht 5' 8\" (1.727 m)   Wt 162 lb (73.5 kg)   SpO2 100%   BMI 24.63 kg/m²     Affect is appropriate. Mood stable  No apparent distress  HEENT --Anicteric sclerae, tympanic membranes normal,  ear canals normal.  PERRL, EOMI, conjunctiva and lids normal.  Disks were sharp  Sinuses were nontender, turbinates normal, hearing normal.  Oropharynx without  erythema, normal tongue, oral mucosa and tonsils. No thyromegaly, JVD, or bruits. Lungs --Clear to auscultation, normal percussion. Heart --Regular rate and rhythm, no murmurs, rubs, gallops, or clicks. Chest wall --Nontender to palpation. PMI normal.  Abdomen -- Soft and nontender, no hepatosplenomegaly or masses. Extremities -- Without cyanosis, clubbing, edema. 2+ pulses equally and bilaterally.     LABS  From 11/10 showed gluc 79, cr 0.90, gfr>60,    alt 27,               wbc 7.4, hb 14.9, plt 212, ua neg, tsh 1.38, uds neg  From 3/12 showed   gluc 94, cr 0.77, gfr 118,     chol 163, tg 60,   hdl 49, ldl-c 102, wbc 4.1, hb 14.2, plt 206, ua neg  From 7/13 showed   gluc 93, cr 0.85, gfr 113,   alt 15, chol 170, tg 112, hdl 46, ldl-c 102,         ua neg  From 11/13 showed                   ck/trop neg  From 1/14 showed   gluc 131, cr 0.91, gfr>60,                wbc 5.6, hb 11.9, plt 138, ua neg  From 8/14 showed                   tsh 1.37, lyme neg  From 11/14 showed gluc 93,   cr 0.65, gfr>60,  alt 10, chol 172, tg 62,   hdl 49, ldl-c 111, wbc 5.0, hb 14.6, plt 216  From 2/17 showed   gluc 93,   cr 0.75, gfr 116, alt 13, chol 210, tg 155, hdl 45, ldl-c 134, wbc 5.8, hb 14.3, plt 259, tsh 2.06  From 3/18 showed   gluc 93,   cr 0.84, gfr 110, alt 7,   chol 186, tg 92,   hdl 47, ldl-c 121    Results for orders placed or performed in visit on 99/22/70   METABOLIC PANEL, COMPREHENSIVE   Result Value Ref Range    Glucose 91 65 - 99 mg/dL    BUN 12 6 - 24 mg/dL    Creatinine 0.90 0.76 - 1.27 mg/dL    GFR est non- >59 mL/min/1.73    GFR est  >59 mL/min/1.73    BUN/Creatinine ratio 13 9 - 20    Sodium 139 134 - 144 mmol/L    Potassium 4.7 3.5 - 5.2 mmol/L    Chloride 103 96 - 106 mmol/L    CO2 24 20 - 29 mmol/L    Calcium 9.3 8.7 - 10.2 mg/dL    Protein, total 6.6 6.0 - 8.5 g/dL    Albumin 4.4 3.5 - 5.5 g/dL    GLOBULIN, TOTAL 2.2 1.5 - 4.5 g/dL    A-G Ratio 2.0 1.2 - 2.2    Bilirubin, total 0.3 0.0 - 1.2 mg/dL    Alk. phosphatase 68 39 - 117 IU/L    AST (SGOT) 12 0 - 40 IU/L    ALT (SGPT) 10 0 - 44 IU/L   CBC W/O DIFF   Result Value Ref Range    WBC 4.1 3.4 - 10.8 x10E3/uL    RBC 4.45 4.14 - 5.80 x10E6/uL    HGB 13.7 13.0 - 17.7 g/dL    HCT 40.4 37.5 - 51.0 %    MCV 91 79 - 97 fL    MCH 30.8 26.6 - 33.0 pg    MCHC 33.9 31.5 - 35.7 g/dL    RDW 13.0 12.3 - 15.4 %    PLATELET 418 308 - 212 x10E3/uL   LIPID PANEL   Result Value Ref Range    Cholesterol, total 205 (H) 100 - 199 mg/dL    Triglyceride 82 0 - 149 mg/dL    HDL Cholesterol 52 >39 mg/dL    VLDL, calculated 16 5 - 40 mg/dL    LDL, calculated 137 (H) 0 - 99 mg/dL   CVD REPORT   Result Value Ref Range    INTERPRETATION Note      Patient Active Problem List   Diagnosis Code    Anxiety F41.9    Hyperlipidemia E78.5    GERD with esophagitis Dr Michelle Meyers K21.0     Assessment and plan:  1. GERD. PPI and avoidance measures  2. Psychiatric. Continjue prozac  3. FHx colo ca. Colonoscopy 2020  4. HLP. Lifestyle and dietary measures, we gave the mediterranean diet sheet previously; he will try to inc exercise program        RTC 3/20    Above conditions discussed at length and patient vocalized understanding.   All questions answered to patient satisfaction

## 2019-03-16 ENCOUNTER — HOSPITAL ENCOUNTER (OUTPATIENT)
Dept: LAB | Age: 44
Discharge: HOME | End: 2019-03-16

## 2019-03-16 LAB — XX-LABCORP SPECIMEN COL,LCBCF: NORMAL

## 2019-03-16 PROCEDURE — 99001 SPECIMEN HANDLING PT-LAB: CPT

## 2019-03-17 LAB
ALBUMIN SERPL-MCNC: 4.4 G/DL (ref 3.5–5.5)
ALBUMIN/GLOB SERPL: 2 {RATIO} (ref 1.2–2.2)
ALP SERPL-CCNC: 68 IU/L (ref 39–117)
ALT SERPL-CCNC: 10 IU/L (ref 0–44)
AST SERPL-CCNC: 12 IU/L (ref 0–40)
BILIRUB SERPL-MCNC: 0.3 MG/DL (ref 0–1.2)
BUN SERPL-MCNC: 12 MG/DL (ref 6–24)
BUN/CREAT SERPL: 13 (ref 9–20)
CALCIUM SERPL-MCNC: 9.3 MG/DL (ref 8.7–10.2)
CHLORIDE SERPL-SCNC: 103 MMOL/L (ref 96–106)
CHOLEST SERPL-MCNC: 205 MG/DL (ref 100–199)
CO2 SERPL-SCNC: 24 MMOL/L (ref 20–29)
CREAT SERPL-MCNC: 0.9 MG/DL (ref 0.76–1.27)
ERYTHROCYTE [DISTWIDTH] IN BLOOD BY AUTOMATED COUNT: 13 % (ref 12.3–15.4)
GLOBULIN SER CALC-MCNC: 2.2 G/DL (ref 1.5–4.5)
GLUCOSE SERPL-MCNC: 91 MG/DL (ref 65–99)
HCT VFR BLD AUTO: 40.4 % (ref 37.5–51)
HDLC SERPL-MCNC: 52 MG/DL
HGB BLD-MCNC: 13.7 G/DL (ref 13–17.7)
INTERPRETATION, 910389: NORMAL
LDLC SERPL CALC-MCNC: 137 MG/DL (ref 0–99)
MCH RBC QN AUTO: 30.8 PG (ref 26.6–33)
MCHC RBC AUTO-ENTMCNC: 33.9 G/DL (ref 31.5–35.7)
MCV RBC AUTO: 91 FL (ref 79–97)
PLATELET # BLD AUTO: 209 X10E3/UL (ref 150–379)
POTASSIUM SERPL-SCNC: 4.7 MMOL/L (ref 3.5–5.2)
PROT SERPL-MCNC: 6.6 G/DL (ref 6–8.5)
RBC # BLD AUTO: 4.45 X10E6/UL (ref 4.14–5.8)
SODIUM SERPL-SCNC: 139 MMOL/L (ref 134–144)
TRIGL SERPL-MCNC: 82 MG/DL (ref 0–149)
VLDLC SERPL CALC-MCNC: 16 MG/DL (ref 5–40)
WBC # BLD AUTO: 4.1 X10E3/UL (ref 3.4–10.8)

## 2019-03-18 ENCOUNTER — OFFICE VISIT (OUTPATIENT)
Dept: INTERNAL MEDICINE CLINIC | Age: 44
End: 2019-03-18

## 2019-03-18 VITALS
RESPIRATION RATE: 14 BRPM | WEIGHT: 162 LBS | SYSTOLIC BLOOD PRESSURE: 120 MMHG | OXYGEN SATURATION: 100 % | DIASTOLIC BLOOD PRESSURE: 82 MMHG | TEMPERATURE: 98 F | HEART RATE: 69 BPM | HEIGHT: 68 IN | BODY MASS INDEX: 24.55 KG/M2

## 2019-03-18 DIAGNOSIS — Z00.00 PHYSICAL EXAM: Primary | ICD-10-CM

## 2019-03-18 DIAGNOSIS — F41.9 ANXIETY: ICD-10-CM

## 2019-03-18 DIAGNOSIS — E78.5 HYPERLIPIDEMIA, UNSPECIFIED HYPERLIPIDEMIA TYPE: ICD-10-CM

## 2019-03-18 DIAGNOSIS — K21.00 GERD WITH ESOPHAGITIS: ICD-10-CM

## 2019-03-18 NOTE — PROGRESS NOTES
Chief Complaint   Patient presents with    Physical     labs         1. Have you been to the ER, urgent care clinic or hospitalized since your last visit? NO.     2. Have you seen or consulted any other health care providers outside of the [address] [place] [address] since your last visit (Include any pap smears or colon screening)?  NO

## 2019-04-08 ENCOUNTER — TELEPHONE (OUTPATIENT)
Dept: INTERNAL MEDICINE CLINIC | Age: 44
End: 2019-04-08

## 2019-04-08 ENCOUNTER — OFFICE VISIT (OUTPATIENT)
Dept: INTERNAL MEDICINE CLINIC | Age: 44
End: 2019-04-08

## 2019-04-08 VITALS
RESPIRATION RATE: 14 BRPM | TEMPERATURE: 98.8 F | HEART RATE: 95 BPM | OXYGEN SATURATION: 96 % | WEIGHT: 162 LBS | DIASTOLIC BLOOD PRESSURE: 83 MMHG | HEIGHT: 68 IN | SYSTOLIC BLOOD PRESSURE: 135 MMHG | BODY MASS INDEX: 24.55 KG/M2

## 2019-04-08 DIAGNOSIS — J40 BRONCHITIS: Primary | ICD-10-CM

## 2019-04-08 DIAGNOSIS — F41.9 ANXIETY: ICD-10-CM

## 2019-04-08 RX ORDER — AZITHROMYCIN 250 MG/1
TABLET, FILM COATED ORAL
Qty: 6 TAB | Refills: 0 | Status: SHIPPED | OUTPATIENT
Start: 2019-04-08 | End: 2020-06-11

## 2019-04-08 RX ORDER — DIAZEPAM 10 MG/1
10 TABLET ORAL
Qty: 30 TAB | Refills: 0 | OUTPATIENT
Start: 2019-04-08 | End: 2020-06-11

## 2019-04-08 NOTE — PROGRESS NOTES
Chief Complaint   Patient presents with    Generalized Body Aches         1. Have you been to the ER, urgent care clinic or hospitalized since your last visit? NO.     2. Have you seen or consulted any other health care providers outside of the [address] [place] [address] since your last visit (Include any pap smears or colon screening)?  NO

## 2019-04-08 NOTE — PROGRESS NOTES
41 y.o. WHITE OR  male who presents for evaluation. He's been feeling unwell since a couple weeks back. A lot of upper respiratory symptoms including sinus and chest congestion, mostly clear drainage. He has not measured a fever, no ear pain, he has not really tried anything OTC outside of Vicks VapoRub. This past weekend, he got really achy, tired and fatigued and got an anxiety attack. He wants refill of the Valium, remains on Prozac. Past Medical History:   Diagnosis Date    Allergic rhinitis     Anxiety and depression     Cervical spine disease 2009    Dr. Theophilus Kayser 2009; MRI showed C5-7 disc extrusion/spur complex w mild spinal stenosis    FHx: colon cancer     GERD with esophagitis 01/2015    Dr Bobby Marquez EGD    History of echocardiogram 04/06/2012    Tech difficult. EF 60-65%. No RWMA. RVSP 15-20 mmHg.  Hyperlipidemia     Reactive hypoglycemia     5/14 3 hr GTT 40    Treadmill stress test  07/08/2011    Negative maximal stress test.  Ex time 13:00. Current Outpatient Medications   Medication Sig    diazePAM (VALIUM) 10 mg tablet Take 1 Tab by mouth every six (6) hours as needed for Anxiety. Max Daily Amount: 40 mg.    azithromycin (ZITHROMAX) 250 mg tablet Take 2 tablets today, then take 1 tablet daily    omeprazole (PRILOSEC) 20 mg capsule Take 1 Cap by mouth daily. (Patient taking differently: Take 20 mg by mouth as needed.)    FLUoxetine (PROZAC) 20 mg capsule TAKE 1 CAPSULE BY MOUTH DAILY    EPINEPHrine (EPIPEN 2-MARY) 0.3 mg/0.3 mL injection 0.3 mL by IntraMUSCular route once as needed for up to 1 dose. No current facility-administered medications for this visit.       Allergies   Allergen Reactions    [place] Anaphylaxis    Avelox [Moxifloxacin] Other (comments)     Felt bad    Cat Dander Itching    Dog Dander Itching     Visit Vitals  /83   Pulse 95   Temp 98.8 °F (37.1 °C) (Oral)   Resp 14   Ht 5' 8\" (1.727 m)   Wt 162 lb (73.5 kg)   SpO2 96%   BMI 24.63 kg/m²     Bilateral tympanic membranes normal except for mild air-fluid levels. Sinuses nontender, oropharynx otherwise benign except for minimal postnasal drainage. No adenopathy. Lungs are clear with good breath sounds. Heart showed regular rate rhythm. Abdomen soft and nontender    Assessment and plan:  1. Bronchitis. I gave him Z-Bakari, OTC options for symptom relief. 2.  Anxiety. Valium refilled        Above conditions discussed at length and patient vocalized understanding.   All questions answered to patient satisfaction

## 2019-09-15 DIAGNOSIS — E78.5 HYPERLIPIDEMIA, UNSPECIFIED HYPERLIPIDEMIA TYPE: ICD-10-CM

## 2019-09-15 DIAGNOSIS — Z00.00 PHYSICAL EXAM: ICD-10-CM

## 2020-05-21 ENCOUNTER — TELEPHONE (OUTPATIENT)
Dept: INTERNAL MEDICINE CLINIC | Age: 45
End: 2020-05-21

## 2020-05-21 DIAGNOSIS — Z30.09 VASECTOMY EVALUATION: Primary | ICD-10-CM

## 2020-05-21 NOTE — TELEPHONE ENCOUNTER
Last Visit: 4/8/19 with MD Jelly Deshpande  Next Appointment: none  Previous Refill Encounter(s): 7/5/18 #90    Requested Prescriptions     Pending Prescriptions Disp Refills    omeprazole (PRILOSEC) 20 mg capsule 90 Cap 0     Sig: Take 1 Cap by mouth daily.

## 2020-05-22 RX ORDER — OMEPRAZOLE 20 MG/1
20 CAPSULE, DELAYED RELEASE ORAL DAILY
Qty: 90 CAP | Refills: 3 | Status: SHIPPED | OUTPATIENT
Start: 2020-05-22 | End: 2022-08-01 | Stop reason: SDUPTHER

## 2020-07-30 ENCOUNTER — TELEPHONE (OUTPATIENT)
Dept: INTERNAL MEDICINE CLINIC | Age: 45
End: 2020-07-30

## 2020-07-30 DIAGNOSIS — Z30.09 VASECTOMY EVALUATION: Primary | ICD-10-CM

## 2020-07-30 DIAGNOSIS — Z30.09 SCREENING AND EVALUATION FOR VASECTOMY: ICD-10-CM

## 2020-07-30 NOTE — TELEPHONE ENCOUNTER
Pt needs referral for Dr[facility] Had a vasectomy today. Z30.09 procedure 69601    Advised we would normally give referral to see Dr. Kalyani Santos and they would need to get the insurance to authorize procedures. She wanted nurse to try the code above.

## 2020-08-14 RX ORDER — FLUOXETINE HYDROCHLORIDE 20 MG/1
CAPSULE ORAL
Qty: 90 CAP | Refills: 2 | Status: SHIPPED | OUTPATIENT
Start: 2020-08-14 | End: 2021-07-15

## 2020-11-11 ENCOUNTER — APPOINTMENT (OUTPATIENT)
Dept: INTERNAL MEDICINE CLINIC | Age: 45
End: 2020-11-11

## 2020-11-12 LAB
ALBUMIN SERPL-MCNC: 4.8 G/DL (ref 4–5)
ALBUMIN/GLOB SERPL: 2.1 {RATIO} (ref 1.2–2.2)
ALP SERPL-CCNC: 74 IU/L (ref 39–117)
ALT SERPL-CCNC: 16 IU/L (ref 0–44)
APPEARANCE UR: CLEAR
AST SERPL-CCNC: 19 IU/L (ref 0–40)
BILIRUB SERPL-MCNC: 0.6 MG/DL (ref 0–1.2)
BILIRUB UR QL STRIP: NEGATIVE
BUN SERPL-MCNC: 11 MG/DL (ref 6–24)
BUN/CREAT SERPL: 12 (ref 9–20)
CALCIUM SERPL-MCNC: 9.7 MG/DL (ref 8.7–10.2)
CHLORIDE SERPL-SCNC: 103 MMOL/L (ref 96–106)
CHOLEST SERPL-MCNC: 224 MG/DL (ref 100–199)
CO2 SERPL-SCNC: 26 MMOL/L (ref 20–29)
COLOR UR: YELLOW
CREAT SERPL-MCNC: 0.9 MG/DL (ref 0.76–1.27)
ERYTHROCYTE [DISTWIDTH] IN BLOOD BY AUTOMATED COUNT: 12.4 % (ref 11.6–15.4)
GLOBULIN SER CALC-MCNC: 2.3 G/DL (ref 1.5–4.5)
GLUCOSE SERPL-MCNC: 90 MG/DL (ref 65–99)
GLUCOSE UR QL: NEGATIVE
HCT VFR BLD AUTO: 46.3 % (ref 37.5–51)
HDLC SERPL-MCNC: 63 MG/DL
HGB BLD-MCNC: 15.2 G/DL (ref 13–17.7)
HGB UR QL STRIP: NEGATIVE
INTERPRETATION, 910389: NORMAL
KETONES UR QL STRIP: NEGATIVE
LDLC SERPL CALC-MCNC: 147 MG/DL (ref 0–99)
LEUKOCYTE ESTERASE UR QL STRIP: NEGATIVE
MCH RBC QN AUTO: 30.2 PG (ref 26.6–33)
MCHC RBC AUTO-ENTMCNC: 32.8 G/DL (ref 31.5–35.7)
MCV RBC AUTO: 92 FL (ref 79–97)
MICRO URNS: ABNORMAL
NITRITE UR QL STRIP: NEGATIVE
PH UR STRIP: >=9 [PH] (ref 5–7.5)
PLATELET # BLD AUTO: 224 X10E3/UL (ref 150–450)
POTASSIUM SERPL-SCNC: 4.7 MMOL/L (ref 3.5–5.2)
PROT SERPL-MCNC: 7.1 G/DL (ref 6–8.5)
PROT UR QL STRIP: NEGATIVE
RBC # BLD AUTO: 5.04 X10E6/UL (ref 4.14–5.8)
SODIUM SERPL-SCNC: 139 MMOL/L (ref 134–144)
SP GR UR: 1.02 (ref 1–1.03)
TRIGL SERPL-MCNC: 78 MG/DL (ref 0–149)
TSH SERPL DL<=0.005 MIU/L-ACNC: 2.38 UIU/ML (ref 0.45–4.5)
UROBILINOGEN UR STRIP-MCNC: 0.2 MG/DL (ref 0.2–1)
VLDLC SERPL CALC-MCNC: 14 MG/DL (ref 5–40)
WBC # BLD AUTO: 4.2 X10E3/UL (ref 3.4–10.8)

## 2020-11-12 NOTE — PROGRESS NOTES
39 y.o. white male who presents for RPE    The prozac continues to control his sx    No cardiovascular complaints. With school being virtual, he is not very active and no set exercise program    No gi or gu issues. He is using ppi prn and doing avoidance measures. He did get vasectomy without complications    Past Medical History:   Diagnosis Date    Allergic rhinitis     Anxiety and depression     Cervical spine disease 2009    Dr. Trevor Nelson 2009; MRI showed C5-7 disc extrusion/spur complex w mild spinal stenosis    FHx: colon cancer     GERD with esophagitis 01/2015    Dr Noe Lane EGD    H/O echocardiogram 04/06/2012    tds, ef 60-65%, no RWMA,  RVSP 15-20 mmHg.       Hyperlipidemia     Reactive hypoglycemia     5/14 3 hr GTT 40     Past Surgical History:   Procedure Laterality Date    CARDIAC SURG PROCEDURE UNLIST  2014    tilt table negative Dr Srini Key      ETT neg ex time 13min (7/11); stress echo neg (5/14)    HX APPENDECTOMY  1/14    Dr Ashley Lane 1/9/15 negative    HX GI  11/10    neg ct abd/pelvis    HX VASECTOMY  07/2020    Dr Laury Magana  8/14    EEG negative Dr Karlee Flores  11/13    CT head negative     Social History     Socioeconomic History    Marital status:      Spouse name: Not on file    Number of children: 3    Years of education: Not on file    Highest education level: Not on file   Occupational History    Occupation: 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[employer]     Employer: [employer]     Employer: [address] Financial resource strain: Not on file    Food insecurity     Worry: Not on file     Inability: Not on file   ROO[employer]ERS needs     Medical: Not on file     Non-medical: Not on file   Tobacco Use    Smoking status: Never Smoker    Smokeless tobacco: Never Used   Substance and Sexual Activity    Alcohol use: Yes     Alcohol/week: 6.7 standard drinks     Types: 8 Cans of beer per week     Comment: 5-6 per week    Drug use: No    Sexual activity: Yes     Partners: Female   Lifestyle    Physical activity     Days per week: Not on file     Minutes per session: Not on file    Stress: Not on file   Relationships    Social connections     Talks on phone: Not on file     Gets together: Not on file     Attends Yazidi service: Not on file     Active member of club or organization: Not on file     Attends meetings of clubs or organizations: Not on file     Relationship status: Not on file    Intimate partner violence     Fear of current or ex partner: Not on file     Emotionally abused: Not on file     Physically abused: Not on file     Forced sexual activity: Not on file   Other Topics Concern    Not on file   Social History Narrative    Not on file     Family History   Problem Relation Age of Onset    Other Mother         lupus    Diabetes Father     Cancer Father 67        prostate cancer    Cancer Maternal Uncle         lung    Heart Disease Maternal Grandfather     Cancer Maternal Uncle         brain tumor     Immunization History   Administered Date(s) Administered    Influenza Vaccine (Quad) PF (>6 Mo Flulaval, Fluarix, and >3 Yrs Afluria, Fluzone 46064) 09/26/2016    Influenza Vaccine PF 10/14/2013     Current Outpatient Medications   Medication Sig    FLUoxetine (PROzac) 20 mg capsule TAKE ONE CAPSULE BY MOUTH DAILY    omeprazole (PRILOSEC) 20 mg capsule Take 1 Cap by mouth daily. No current facility-administered medications for this visit.       Allergies   Allergen Reactions    [place] Anaphylaxis    Avelox [Moxifloxacin] Other (comments)     Felt bad    Cat Dander Itching    Dog Dander Itching     REVIEW OF SYSTEMS: colo 1/15 Dr Lacey Salas  Ophtho  no vision change or eye pain  Oral  no mouth pain, tongue or tooth problems  Ears  no hearing loss, ear pain, fullness, no swallowing problems  Cardiac  no CP, PND, orthopnea, edema, palpitations or syncope  Chest  no breast masses  Resp  no wheezing, chronic coughing, dyspnea  GI  no heartburn, nausea, vomiting, change in bowel habits, bleeding, hemorrhoids  Urinary  no dysuria, hematuria, flank pain, urgency, frequency    Visit Vitals  /88   Pulse 72   Temp 98.1 °F (36.7 °C) (Temporal)   Resp 18   Ht 5' 8\" (1.727 m)   Wt 162 lb (73.5 kg)   SpO2 99%   BMI 24.63 kg/m²   A&O x3  Affect is appropriate. Mood stable  No apparent distress  Anicteric, no JVD, adenopathy or thyromegaly. No carotid bruits or radiated murmur  Lungs clear to auscultation, no wheezes or rales  Heart showed regular rate and rhythm. No murmur, rubs, gallops  Abdomen soft nontender, no hepatosplenomegaly or masses. Extremities without edema.   Pulses 1-2+ symmetrically    LABS  From 11/10 showed gluc 79, cr 0.90, gfr>60,    alt 27,               wbc 7.4, hb 14.9, plt 212, ua neg, tsh 1.38, uds neg  From 3/12 showed   gluc 94, cr 0.77, gfr 118,     chol 163, tg 60,   hdl 49, ldl-c 102, wbc 4.1, hb 14.2, plt 206, ua neg  From 7/13 showed   gluc 93, cr 0.85, gfr 113,   alt 15, chol 170, tg 112, hdl 46, ldl-c 102,         ua neg  From 11/13 showed                   ck/trop neg  From 1/14 showed   gluc 131, cr 0.91, gfr>60,                wbc 5.6, hb 11.9, plt 138, ua neg  From 8/14 showed                   tsh 1.37, lyme neg  From 11/14 showed gluc 93,   cr 0.65, gfr>60,  alt 10, chol 172, tg 62,   hdl 49, ldl-c 111, wbc 5.0, hb 14.6, plt 216  From 2/17 showed   gluc 93,   cr 0.75, gfr 116, alt 13, chol 210, tg 155, hdl 45, ldl-c 134, wbc 5.8, hb 14.3, plt 259,      tsh 2.06  From 3/18 showed   gluc 93,   cr 0.84, gfr 110, alt 7,   chol 186, tg 92,   hdl 47, ldl-c 121  From 3/19 showed   gluc 91,   cr 0.90, gfr>60,  alt 10, chol 205, tg 82,   hdl 52, ldl-c 137, wbc 4.1, hb 13.7, plt 209  From 11/20 showed gluc 90,   cr 0.90, gfr>60,  alt 16, chol 224, tg 78,   hdl 63, ldl-c 147, wbc 4.2, hb 15.2, plt 224, ua neg, tsh 2.38    We reviewed the patient's labs from the last several visits to point out trends in the numbers        Patient Active Problem List   Diagnosis Code    Anxiety F41.9    Hyperlipidemia E78.5    GERD with esophagitis Dr Lakshmi Montero K21.00     Assessment and plan:  1. GERD. PPI and avoidance measures  2. Psychiatric. Continjue prozac  3. FHx colo ca. Colonoscopy 2020? 4. HLP. Long discussion  Lifestyle and dietary measures, we gave the mediterranean diet sheet previously; he will try to inc exercise program        RTC 11/21    Above conditions discussed at length and patient vocalized understanding.   All questions answered to patient satisfaction

## 2020-11-17 ENCOUNTER — OFFICE VISIT (OUTPATIENT)
Dept: INTERNAL MEDICINE CLINIC | Age: 45
End: 2020-11-17
Payer: COMMERCIAL

## 2020-11-17 VITALS
TEMPERATURE: 98.1 F | HEIGHT: 68 IN | OXYGEN SATURATION: 99 % | SYSTOLIC BLOOD PRESSURE: 137 MMHG | DIASTOLIC BLOOD PRESSURE: 88 MMHG | BODY MASS INDEX: 24.55 KG/M2 | WEIGHT: 162 LBS | RESPIRATION RATE: 18 BRPM | HEART RATE: 72 BPM

## 2020-11-17 DIAGNOSIS — F41.9 ANXIETY: ICD-10-CM

## 2020-11-17 DIAGNOSIS — Z80.0 FHX: COLON CANCER: ICD-10-CM

## 2020-11-17 DIAGNOSIS — K21.00 GASTROESOPHAGEAL REFLUX DISEASE WITH ESOPHAGITIS WITHOUT HEMORRHAGE: ICD-10-CM

## 2020-11-17 DIAGNOSIS — Z00.00 PHYSICAL EXAM: Primary | ICD-10-CM

## 2020-11-17 DIAGNOSIS — E78.5 HYPERLIPIDEMIA, UNSPECIFIED HYPERLIPIDEMIA TYPE: ICD-10-CM

## 2020-11-17 PROCEDURE — 99396 PREV VISIT EST AGE 40-64: CPT | Performed by: INTERNAL MEDICINE

## 2020-11-17 NOTE — PROGRESS NOTES
Jamaica Thomas presents today for   Chief Complaint   Patient presents with    Complete Physical    GERD    Labs     completed on 11-11-20             Coordination of Care:  1. Have you been to the ER, urgent care clinic since your last visit? Hospitalized since your last visit? NO    2. Have you seen or consulted any other health care providers outside of the [address] [place] [address] since your last visit? Include any pap smears or colon screening.  NO

## 2020-11-19 ENCOUNTER — TELEPHONE (OUTPATIENT)
Dept: INTERNAL MEDICINE CLINIC | Age: 45
End: 2020-11-19

## 2020-11-19 DIAGNOSIS — R21 RASH: Primary | ICD-10-CM

## 2020-11-19 RX ORDER — TRIAMCINOLONE ACETONIDE 5 MG/G
CREAM TOPICAL 2 TIMES DAILY
Qty: 15 G | Refills: 1 | Status: SHIPPED | OUTPATIENT
Start: 2020-11-19 | End: 2021-11-18

## 2020-11-19 NOTE — TELEPHONE ENCOUNTER
Pt states RD was going to call in a steroid cream to his pharmacy [facility] on [address] but nothing was sent. He doesn't know name of it.

## 2021-02-18 ENCOUNTER — TELEPHONE (OUTPATIENT)
Dept: INTERNAL MEDICINE CLINIC | Age: 46
End: 2021-02-18

## 2021-02-19 NOTE — TELEPHONE ENCOUNTER
Spoke with Whitney Vaz at [facility]    Referral number [id number]- status Approved for Dx Z30.09 and  procedure code 09081 done with  on 7/30/20. LMTCB for patients wife to advise her procedure has been approved. Unable to reach anyone at [facility] to advise them as well.

## 2021-04-06 NOTE — TELEPHONE ENCOUNTER
Last Visit: 2020 with MD Valentin Roque  Next Appointment: 2021 with MD Nicola House  Previous Refill Encounter(s): 2018 per MD Valentin Roque #[id number]    Requested Prescriptions     Pending Prescriptions Disp Refills    EPINEPHrine (EPIPEN) 0.3 mg/0.3 mL injection 2 Syringe 5     Si.3 mL by IntraMUSCular route once as needed for Allergic Response for up to 1 dose.

## 2021-04-07 RX ORDER — EPINEPHRINE 0.3 MG/.3ML
0.3 INJECTION SUBCUTANEOUS
Qty: 2 SYRINGE | Refills: 5 | Status: SHIPPED | OUTPATIENT
Start: 2021-04-07 | End: 2022-07-08

## 2021-05-06 ENCOUNTER — TELEPHONE (OUTPATIENT)
Dept: INTERNAL MEDICINE CLINIC | Age: 46
End: 2021-05-06

## 2021-05-10 ENCOUNTER — OFFICE VISIT (OUTPATIENT)
Dept: INTERNAL MEDICINE CLINIC | Age: 46
End: 2021-05-10
Payer: COMMERCIAL

## 2021-05-10 VITALS
SYSTOLIC BLOOD PRESSURE: 136 MMHG | TEMPERATURE: 97.9 F | DIASTOLIC BLOOD PRESSURE: 86 MMHG | RESPIRATION RATE: 12 BRPM | HEIGHT: 68 IN | BODY MASS INDEX: 25.28 KG/M2 | HEART RATE: 77 BPM | OXYGEN SATURATION: 98 % | WEIGHT: 166.8 LBS

## 2021-05-10 DIAGNOSIS — G47.30 SLEEP APNEA, UNSPECIFIED TYPE: ICD-10-CM

## 2021-05-10 DIAGNOSIS — R19.7 DIARRHEA OF PRESUMED INFECTIOUS ORIGIN: Primary | ICD-10-CM

## 2021-05-10 DIAGNOSIS — R19.7 DIARRHEA OF PRESUMED INFECTIOUS ORIGIN: ICD-10-CM

## 2021-05-10 PROCEDURE — 99214 OFFICE O/P EST MOD 30 MIN: CPT | Performed by: INTERNAL MEDICINE

## 2021-05-11 NOTE — PROGRESS NOTES
39 y.o. WHITE male who presents for evaluation. Over a week ago, he started eating into a store bought salsa but did not finish the bottle. A week ago, he got more servings but lundberg he opened the bottle, there was a pop and he noted bubbles. Ate it without problems. The next day, he was preparing lunch and again, he noted 'bubbling' when he opened the bottle but still proceeded to eat it for lunch. The following day, he started having some loose stools and abd cramps. He did some research and became concerned about the possibility of botulism. The loose stools a couple days ago and he has not had a bm today. Still having mild cramps but no n,v,f, bleeding. Absolutely no neurologic sx, CN complaints    Secondly, his wife wants him to get worked up for SANDRA. She has noted that his snoring is getting worse and she has witnessed apnea episodes. Pt feels tired in the mornings and feels he needs more sleep despite several good hours in bed     Past Medical History:   Diagnosis Date    Allergic rhinitis     Anxiety and depression     Cervical spine disease 2009    Dr. Dilma Hargrove 2009; MRI showed C5-7 disc extrusion/spur complex w mild spinal stenosis    FHx: colon cancer     GERD with esophagitis 01/2015    Dr Tiffany Duke EGD    H/O echocardiogram 04/06/2012    tds, ef 60-65%, no RWMA,  RVSP 15-20 mmHg.       Hyperlipidemia     Reactive hypoglycemia     5/14 3 hr GTT 40     Past Surgical History:   Procedure Laterality Date    CARDIAC SURG PROCEDURE UNLIST  2014    tilt table negative Dr Mitchell Serum      ETT neg ex time 13min (7/11); stress echo neg (5/14)    HX APPENDECTOMY  1/14    Dr Av Duke 1/9/15 negative    HX GI  11/10    neg ct abd/pelvis    HX VASECTOMY  07/2020    Dr Bakari Izaguirre  8/14    EEG negative Dr Jim Kerr  11/13    CT head negative     Social History Socioeconomic History    Marital status:      Spouse name: Not on file    Number of children: 3    Years of education: Not on file    Highest education level: Not on file   Occupational History    Occupation: 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[employer]     Employer: [employer] [address] Financial resource strain: Not on file    Food insecurity     Worry: Not on file     Inability: Not on file   [employer] needs     Medical: Not on file     Non-medical: Not on file   Tobacco Use    Smoking status: Never Smoker    Smokeless tobacco: Never Used   Substance and Sexual Activity    Alcohol use: Yes     Alcohol/week: 6.7 standard drinks     Types: 8 Cans of beer per week     Comment: 5-6 per week    Drug use: No    Sexual activity: Yes     Partners: Female   Lifestyle    Physical activity     Days per week: Not on file     Minutes per session: Not on file    Stress: Not on file   Relationships    Social connections     Talks on phone: Not on file     Gets together: Not on file     Attends Latter-day service: Not on file     Active member of club or organization: Not on file     Attends meetings of clubs or organizations: Not on file     Relationship status: Not on file    Intimate partner violence     Fear of current or ex partner: Not on file     Emotionally abused: Not on file     Physically abused: Not on file     Forced sexual activity: Not on file   Other Topics Concern    Not on file   Social History Narrative    Not on file     Current Outpatient Medications   Medication Sig    triamcinolone (ARISTOCORT) 0.5 % topical cream Apply  to affected area two (2) times a day. use thin layer    FLUoxetine (PROzac) 20 mg capsule TAKE ONE CAPSULE BY MOUTH DAILY    omeprazole (PRILOSEC) 20 mg capsule Take 1 Cap by mouth daily. No current facility-administered medications for this visit.       Allergies   Allergen Reactions    [place] Anaphylaxis    Avelox [Moxifloxacin] Other (comments)     Felt bad    Cat Dander Itching    Dog Dander Itching     Visit Vitals  /86 (BP 1 Location: Left upper arm, BP Patient Position: Sitting)   Pulse 77   Temp 97.9 °F (36.6 °C) (Temporal)   Resp 12   Ht 5' 8\" (1.727 m)   Wt 166 lb 12.8 oz (75.7 kg)   SpO2 98%   BMI 25.36 kg/m²   A&O x3  Affect is appropriate. Mood stable  No apparent distress  Anicteric, no JVD, adenopathy or thyromegaly. No carotid bruits or radiated murmur  Lungs clear to auscultation, no wheezes or rales  Heart showed regular rate and rhythm. No murmur, rubs, gallops  Abdomen soft nontender, no hepatosplenomegaly or masses. Extremities without edema. Pulses 1-2+ symmetrically  Cn 2-12 intact    Assessment and plan:  1. Probable food poisoning. Sx are almost resolved but will give him chance to collect stool sample if they recur. Doubt botulism but we went over the sx and told him to present to ER the moment he has any  2. R/O samreen. Will send to neuro for furhter evaluation        Above conditions discussed at length and patient vocalized understanding.   All questions answered to patient satisfaction

## 2021-05-17 DIAGNOSIS — Z00.00 PHYSICAL EXAM: ICD-10-CM

## 2021-08-07 NOTE — TELEPHONE ENCOUNTER
Pt's spouse Brigitte said Luiza has denied pt's vasectomy because our office didn't get authorization for the [facility] who [facility]. Procedure was done 7/30/20 by Dr Thea Nissen. Please advise her at [phone] once approval can be done? ?? Statement Selected

## 2021-11-10 ENCOUNTER — APPOINTMENT (OUTPATIENT)
Dept: INTERNAL MEDICINE CLINIC | Age: 46
End: 2021-11-10

## 2021-11-11 LAB
ALBUMIN SERPL-MCNC: 4.7 G/DL (ref 4–5)
ALBUMIN/GLOB SERPL: 2 {RATIO} (ref 1.2–2.2)
ALP SERPL-CCNC: 84 IU/L (ref 44–121)
ALT SERPL-CCNC: 15 IU/L (ref 0–44)
AST SERPL-CCNC: 17 IU/L (ref 0–40)
BILIRUB SERPL-MCNC: 0.3 MG/DL (ref 0–1.2)
BUN SERPL-MCNC: 10 MG/DL (ref 6–24)
BUN/CREAT SERPL: 12 (ref 9–20)
CALCIUM SERPL-MCNC: 9.4 MG/DL (ref 8.7–10.2)
CHLORIDE SERPL-SCNC: 104 MMOL/L (ref 96–106)
CHOLEST SERPL-MCNC: 213 MG/DL (ref 100–199)
CO2 SERPL-SCNC: 27 MMOL/L (ref 20–29)
CREAT SERPL-MCNC: 0.83 MG/DL (ref 0.76–1.27)
ERYTHROCYTE [DISTWIDTH] IN BLOOD BY AUTOMATED COUNT: 11.8 % (ref 11.6–15.4)
GLOBULIN SER CALC-MCNC: 2.4 G/DL (ref 1.5–4.5)
GLUCOSE SERPL-MCNC: 83 MG/DL (ref 65–99)
HCT VFR BLD AUTO: 43.4 % (ref 37.5–51)
HDLC SERPL-MCNC: 59 MG/DL
HGB BLD-MCNC: 14.5 G/DL (ref 13–17.7)
IMP & REVIEW OF LAB RESULTS: NORMAL
LDLC SERPL CALC-MCNC: 136 MG/DL (ref 0–99)
MCH RBC QN AUTO: 30.9 PG (ref 26.6–33)
MCHC RBC AUTO-ENTMCNC: 33.4 G/DL (ref 31.5–35.7)
MCV RBC AUTO: 92 FL (ref 79–97)
PLATELET # BLD AUTO: 206 X10E3/UL (ref 150–450)
POTASSIUM SERPL-SCNC: 4.6 MMOL/L (ref 3.5–5.2)
PROT SERPL-MCNC: 7.1 G/DL (ref 6–8.5)
RBC # BLD AUTO: 4.7 X10E6/UL (ref 4.14–5.8)
SODIUM SERPL-SCNC: 142 MMOL/L (ref 134–144)
TRIGL SERPL-MCNC: 100 MG/DL (ref 0–149)
VLDLC SERPL CALC-MCNC: 18 MG/DL (ref 5–40)
WBC # BLD AUTO: 4 X10E3/UL (ref 3.4–10.8)

## 2021-11-14 NOTE — PROGRESS NOTES
42 y.o. white male who presents for RPE    The prozac continues to control his sx and wants to continue    No cardiovascular complaints. trying to exercise but fairly sporadic as busy with work and when he gets home, he wants to spend time with his 10 and 7 yo kids    No gi or gu issues. He is using ppi prn and doing avoidance measures    Past Medical History:   Diagnosis Date    Allergic rhinitis     Anxiety and depression     Degenerative arthritis of cervical spine 2009    Dr. Lakisha Leigh 2009; MRI showed C5-7 disc extrusion/spur complex w mild spinal stenosis    FHx: colon cancer     GERD with esophagitis 01/2015    Dr Jana Malin EGD    H/O echocardiogram 04/06/2012    tds, ef 60-65%, no RWMA,  RVSP 15-20 mmHg.  Hyperlipidemia     Reactive hypoglycemia     5/14 3 hr GTT 40     Past Surgical History:   Procedure Laterality Date    HX APPENDECTOMY  1/14    Dr Denny Malin 1/9/15 negative    HX GI  11/10    neg ct abd/pelvis    HX VASECTOMY  07/2020    Dr Barron Fleming  8/14    EEG negative Dr Tran Owen  11/13    CT head negative    [place] CARDIAC SURG PROCEDURE UNLIST  2014    tilt table negative Dr Bigg Novak      ETT neg ex time 13min (7/11); stress echo neg (5/14)     Social History     Socioeconomic History    Marital status:      Spouse name: Not on file    Number of children: 3    Years of education: Not on file    Highest education level: Not on file   Occupational History    Occupation: 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[employer]     Employer: [employer]     Employer: [employer]   Tobacco Use    Smoking status: Never Smoker    Smokeless tobacco: Never Used   Substance and Sexual Activity    Alcohol use:  Yes     Alcohol/week: 6.7 standard drinks     Types: 8 Cans of beer per week     Comment: 5-6 per week    Drug use: No    Sexual activity: Yes Partners: Female   Other Topics Concern    Not on file   Social History Narrative    Not on file     Social Determinants of Health     Financial Resource Strain:     Difficulty of Paying Living Expenses: Not on file   Food Insecurity:     Worried About Running Out of Food in the Last Year: Not on file    Jessica of Food in the Last Year: Not on file   Transportation Needs:     Lack of Transportation (Medical): Not on file    Lack of Transportation (Non-Medical):  Not on file   Physical Activity:     Days of Exercise per Week: Not on file    Minutes of Exercise per Session: Not on file   Stress:     Feeling of Stress : Not on file   Social Connections:     Frequency of Communication with Friends and Family: Not on file    Frequency of Social Gatherings with Friends and Family: Not on file    Attends Cheondoism Services: Not on file    Active Member of [address] [place] [address] [employer] or Organizations: Not on file    Attends Club or Organization Meetings: Not on file    Marital Status: Not on file   Intimate Partner Violence:     Fear of Current or Ex-Partner: Not on file    Emotionally Abused: Not on file    Physically Abused: Not on file    Sexually Abused: Not on file   Housing Stability:     Unable to Pay for Housing in the Last Year: Not on file    Number of [place] in the Last Year: Not on file    Unstable Housing in the Last Year: Not on file     Family History   Problem Relation Age of Onset    Other Mother         lupus    Diabetes Father     Cancer Father 67        prostate cancer    Cancer Maternal Uncle         lung    Heart Disease Maternal Grandfather     Cancer Maternal Uncle         brain tumor     Immunization History   Administered Date(s) Administered    COVID-19, PFIZER, MRNA, LNP-S, PF, 30MCG/0.3ML DOSE 07/01/2021, 07/22/2021    Influenza Vaccine (Quad) PF (>6 Mo Flulaval, Fluarix, and >3 Yrs Afluria, Fluzone 84156) 09/26/2016    Influenza Vaccine PF 10/14/2013     Current Outpatient Medications   Medication Sig    FLUoxetine (PROzac) 20 mg capsule TAKE ONE CAPSULE BY MOUTH DAILY    omeprazole (PRILOSEC) 20 mg capsule Take 1 Cap by mouth daily. (Patient taking differently: Take 20 mg by mouth daily. As needed)     No current facility-administered medications for this visit. Allergies   Allergen Reactions    [place] Anaphylaxis    Avelox [Moxifloxacin] Other (comments)     Felt bad    Cat Dander Itching    Dog Dander Itching     REVIEW OF SYSTEMS: colo 1/15 Dr Miroslava Nails  Ophtho - no vision change or eye pain  Oral - no mouth pain, tongue or tooth problems  Ears - no hearing loss, ear pain, fullness, no swallowing problems  Cardiac - no CP, PND, orthopnea, edema, palpitations or syncope  Chest - no breast masses  Resp - no wheezing, chronic coughing, dyspnea  GI - no heartburn, nausea, vomiting, change in bowel habits, bleeding, hemorrhoids  Urinary - no dysuria, hematuria, flank pain, urgency, frequency    Visit Vitals  /75   Pulse 75   Temp 97.7 °F (36.5 °C) (Temporal)   Resp 16   Ht 5' 8\" (1.727 m)   Wt 162 lb (73.5 kg)   SpO2 100%   BMI 24.63 kg/m²   A&O x3  Affect is appropriate. Mood stable  No apparent distress  Anicteric, no JVD, adenopathy or thyromegaly. No carotid bruits or radiated murmur  Lungs clear to auscultation, no wheezes or rales  Heart showed regular rate and rhythm. No murmur, rubs, gallops  Abdomen soft nontender, no hepatosplenomegaly or masses. Extremities without edema.   Pulses 1-2+ symmetrically    LABS  From 11/10 showed gluc 79, cr 0.90, gfr>60,    alt 27,               wbc 7.4, hb 14.9, plt 212, ua neg, tsh 1.38, uds neg  From 3/12 showed   gluc 94, cr 0.77, gfr 118,     chol 163, tg 60,   hdl 49, ldl-c 102, wbc 4.1, hb 14.2, plt 206, ua neg  From 7/13 showed   gluc 93, cr 0.85, gfr 113,   alt 15, chol 170, tg 112, hdl 46, ldl-c 102,         ua neg  From 11/13 showed                   ck/trop neg  From 1/14 showed   gluc 131, cr 0.91, gfr>60, wbc 5.6, hb 11.9, plt 138, ua neg  From 8/14 showed                   tsh 1.37, lyme neg  From 11/14 showed gluc 93,   cr 0.65, gfr>60,  alt 10, chol 172, tg 62,   hdl 49, ldl-c 111, wbc 5.0, hb 14.6, plt 216  From 2/17 showed   gluc 93,   cr 0.75, gfr 116, alt 13, chol 210, tg 155, hdl 45, ldl-c 134, wbc 5.8, hb 14.3, plt 259,      tsh 2.06  From 3/18 showed   gluc 93,   cr 0.84, gfr 110, alt 7,   chol 186, tg 92,   hdl 47, ldl-c 121  From 3/19 showed   gluc 91,   cr 0.90, gfr>60,  alt 10, chol 205, tg 82,   hdl 52, ldl-c 137, wbc 4.1, hb 13.7, plt 209  From 11/20 showed gluc 90,   cr 0.90, gfr>60,  alt 16, chol 224, tg 78,   hdl 63, ldl-c 147, wbc 4.2, hb 15.2, plt 224, ua neg, tsh 2.38    Results for orders placed or performed in visit on 44/06/58   METABOLIC PANEL, COMPREHENSIVE   Result Value Ref Range    Glucose 83 65 - 99 mg/dL    BUN 10 6 - 24 mg/dL    Creatinine 0.83 0.76 - 1.27 mg/dL    GFR est non- >59 mL/min/1.73    GFR est  >59 mL/min/1.73    BUN/Creatinine ratio 12 9 - 20    Sodium 142 134 - 144 mmol/L    Potassium 4.6 3.5 - 5.2 mmol/L    Chloride 104 96 - 106 mmol/L    CO2 27 20 - 29 mmol/L    Calcium 9.4 8.7 - 10.2 mg/dL    Protein, total 7.1 6.0 - 8.5 g/dL    Albumin 4.7 4.0 - 5.0 g/dL    GLOBULIN, TOTAL 2.4 1.5 - 4.5 g/dL    A-G Ratio 2.0 1.2 - 2.2    Bilirubin, total 0.3 0.0 - 1.2 mg/dL    Alk.  phosphatase 84 44 - 121 IU/L    AST (SGOT) 17 0 - 40 IU/L    ALT (SGPT) 15 0 - 44 IU/L   CBC W/O DIFF   Result Value Ref Range    WBC 4.0 3.4 - 10.8 x10E3/uL    RBC 4.70 4.14 - 5.80 x10E6/uL    HGB 14.5 13.0 - 17.7 g/dL    HCT 43.4 37.5 - 51.0 %    MCV 92 79 - 97 fL    MCH 30.9 26.6 - 33.0 pg    MCHC 33.4 31.5 - 35.7 g/dL    RDW 11.8 11.6 - 15.4 %    PLATELET 953 946 - 423 x10E3/uL   LIPID PANEL   Result Value Ref Range    Cholesterol, total 213 (H) 100 - 199 mg/dL    Triglyceride 100 0 - 149 mg/dL    HDL Cholesterol 59 >39 mg/dL    VLDL, calculated 18 5 - 40 mg/dL    LDL, calculated 136 (H) 0 - 99 mg/dL   CVD REPORT   Result Value Ref Range    INTERPRETATION Note      We reviewed the patient's labs from the last several visits to point out trends in the numbers          Patient Active Problem List   Diagnosis Code    Anxiety F41.9    Hyperlipidemia E78.5    GERD with esophagitis Dr Yakov Muniz K21.00     Assessment and plan:  1. GERD. PPI and avoidance measures  2. Psychiatric. Continue prozac  3. FHx colo ca. He was called by GI recently and will try to get done  4. HLP. Lifestyle and dietary measures, gave mediterranean diet sheet previously; try to inc exercise program        RTC 11/22    Above conditions discussed at length and patient vocalized understanding. All questions answered to patient satisfaction        ICD-10-CM ICD-9-CM    1. Physical exam  Z00.00 V70.9 CBC W/O DIFF      METABOLIC PANEL, COMPREHENSIVE      LIPID PANEL   2. Gastroesophageal reflux disease with esophagitis without hemorrhage  K21.00 530.81      530.10    3. Hyperlipidemia, unspecified hyperlipidemia type  E78.5 272.4    4.  Anxiety  F41.9 300.00

## 2021-11-18 ENCOUNTER — OFFICE VISIT (OUTPATIENT)
Dept: INTERNAL MEDICINE CLINIC | Age: 46
End: 2021-11-18
Payer: COMMERCIAL

## 2021-11-18 VITALS
BODY MASS INDEX: 24.55 KG/M2 | WEIGHT: 162 LBS | DIASTOLIC BLOOD PRESSURE: 75 MMHG | HEIGHT: 68 IN | OXYGEN SATURATION: 100 % | HEART RATE: 75 BPM | TEMPERATURE: 97.7 F | RESPIRATION RATE: 16 BRPM | SYSTOLIC BLOOD PRESSURE: 116 MMHG

## 2021-11-18 DIAGNOSIS — E78.5 HYPERLIPIDEMIA, UNSPECIFIED HYPERLIPIDEMIA TYPE: ICD-10-CM

## 2021-11-18 DIAGNOSIS — K21.00 GASTROESOPHAGEAL REFLUX DISEASE WITH ESOPHAGITIS WITHOUT HEMORRHAGE: ICD-10-CM

## 2021-11-18 DIAGNOSIS — F41.9 ANXIETY: ICD-10-CM

## 2021-11-18 DIAGNOSIS — Z00.00 PHYSICAL EXAM: Primary | ICD-10-CM

## 2021-11-18 PROCEDURE — 99396 PREV VISIT EST AGE 40-64: CPT | Performed by: INTERNAL MEDICINE

## 2021-11-18 NOTE — PROGRESS NOTES
Ming Bernabe presents with   Chief Complaint   Patient presents with   Zannie Cowden Physical    Labs     11-10-21        1. \"Have you been to the ER, urgent care clinic since your last visit? Hospitalized since your last visit? \" NO    2. \"Have you seen or consulted any other health care providers outside of the [address] [place] [address] since your last visit? \" NO

## 2022-02-16 ENCOUNTER — PATIENT MESSAGE (OUTPATIENT)
Dept: INTERNAL MEDICINE CLINIC | Age: 47
End: 2022-02-16

## 2022-02-16 DIAGNOSIS — B00.9 HSV INFECTION: Primary | ICD-10-CM

## 2022-02-16 RX ORDER — VALACYCLOVIR HYDROCHLORIDE 500 MG/1
500 TABLET, FILM COATED ORAL 2 TIMES DAILY
Qty: 6 TABLET | Refills: 5 | Status: SHIPPED | OUTPATIENT
Start: 2022-02-16

## 2022-02-16 NOTE — TELEPHONE ENCOUNTER
From: Rancho Lopez  To: Reji Gibson MD  Sent: 2/16/2022 2:32 PM EST  Subject: Prescription Request    Goood afternoon. Experiencing a herpes outbreak and would like a prescription called in if possible without an appt. Havent had an outbreak in 20 years so whatever you recommend. Thank you.

## 2022-02-22 NOTE — TELEPHONE ENCOUNTER
pls call    How is the sore that he reported?     If not improving, the earliest I have is 1120 friday

## 2022-02-28 ENCOUNTER — OFFICE VISIT (OUTPATIENT)
Dept: INTERNAL MEDICINE CLINIC | Age: 47
End: 2022-02-28
Payer: COMMERCIAL

## 2022-02-28 VITALS
RESPIRATION RATE: 16 BRPM | SYSTOLIC BLOOD PRESSURE: 118 MMHG | HEART RATE: 80 BPM | OXYGEN SATURATION: 98 % | HEIGHT: 68 IN | BODY MASS INDEX: 25.16 KG/M2 | WEIGHT: 166 LBS | DIASTOLIC BLOOD PRESSURE: 69 MMHG | TEMPERATURE: 97.7 F

## 2022-02-28 DIAGNOSIS — R21 RASH: Primary | ICD-10-CM

## 2022-02-28 PROCEDURE — 99213 OFFICE O/P EST LOW 20 MIN: CPT | Performed by: INTERNAL MEDICINE

## 2022-02-28 RX ORDER — TRIAMCINOLONE ACETONIDE 5 MG/G
CREAM TOPICAL 2 TIMES DAILY
Qty: 30 G | Refills: 1 | Status: SHIPPED | OUTPATIENT
Start: 2022-02-28 | End: 2022-08-01 | Stop reason: SDUPTHER

## 2022-02-28 NOTE — PROGRESS NOTES
Jb Hunt presents with   Chief Complaint   Patient presents with    Rash     X about 3 weeks, on L arm, lower/upper, patient states he did not recently change soaps/detergents, or done anything else that he can think of to trigger it, also behind R knee and states it was on his neck but that cleared            1. \"Have you been to the ER, urgent care clinic since your last visit? Hospitalized since your last visit? \" NO    2. \"Have you seen or consulted any other health care providers outside of the [address] [place] [address] since your last visit? \" NO    3. For patients aged 39-70: Has the patient had a colonoscopy? NA - based on age     If the patient is female:    4. For patients aged 41-77: Has the patient had a mammogram within the past 2 years? NA - based on age    11. For patients aged 21-65: Has the patient had a pap smear?  NA - based on age

## 2022-03-01 NOTE — PROGRESS NOTES
42 y.o. WHITE/NON- male who presents for evaluation. About 5-6 weeks ago, he noted onset of a rash in his genital area. Present on the shaft and scrotal region. He assumed it was his hsv coming back (only other episode was in his early 25s) but it didn't go away so he finally called 2/16 and we gave him valtrex. About 3 weeks ago, he noted onset of rash in his left forearm and right knee which he thought was eczema. No known exposures, toiletries, chemicals, etc.  Neither he nor his wife has had extramarital contact, the wife is asymptomatic. He denies any urethral, testicular, urinary sx. The rashes are pruritic, not painful    Past Medical History:   Diagnosis Date    Allergic rhinitis     Anxiety and depression     Degenerative arthritis of cervical spine 2009    Dr. Lainez Para 2009; MRI showed C5-7 disc extrusion/spur complex w mild spinal stenosis    FHx: colon cancer     GERD with esophagitis 01/2015    Dr Elder Long EGD    H/O echocardiogram 04/06/2012    tds, ef 60-65%, no RWMA,  RVSP 15-20 mmHg.  HSV (herpes simplex virus) infection 2000    Hyperlipidemia     Reactive hypoglycemia     5/14 3 hr GTT 40     Current Outpatient Medications   Medication Sig    triamcinolone (ARISTOCORT) 0.5 % topical cream Apply  to affected area two (2) times a day. use thin layer to affected area twice daily    valACYclovir (VALTREX) 500 mg tablet Take 1 Tablet by mouth two (2) times a day.  FLUoxetine (PROzac) 20 mg capsule TAKE ONE CAPSULE BY MOUTH DAILY    omeprazole (PRILOSEC) 20 mg capsule Take 1 Cap by mouth daily. (Patient taking differently: Take 20 mg by mouth daily. As needed)     No current facility-administered medications for this visit.      Allergies   Allergen Reactions    [place] Anaphylaxis    Avelox [Moxifloxacin] Other (comments)     Felt bad    Cat Dander Itching    Dog Dander Itching     Visit Vitals  /69   Pulse 80   Temp 97.7 °F (36.5 °C) (Temporal)   Resp 16   Ht 5' 8\" (1.727 m)   Wt 166 lb (75.3 kg)   SpO2 98%   BMI 25.24 kg/m²   healing rash in the penile shaft, some red areas in the glans, thicker rash right ant scrotal region. Eczematous looking rash in the forearm and post right knee    Assessment and plan:  1. Rash. Long discussion. Will treat with tmc cream and send for derm consult. Discussed possibly getting std studies but he declined for now with low index of suspicion        Above conditions discussed at length and patient vocalized understanding.   All questions answered to patient satisfaction

## 2022-03-18 PROBLEM — K21.00 GERD WITH ESOPHAGITIS: Status: ACTIVE | Noted: 2017-02-06

## 2022-03-18 PROBLEM — E78.5 HYPERLIPIDEMIA: Status: ACTIVE | Noted: 2017-02-06

## 2022-04-22 NOTE — PROGRESS NOTES
1. Have you been to the ER, urgent care clinic or hospitalized since your last visit? NO.     2. Have you seen or consulted any other health care providers outside of the [address] [place] [address] since your last visit (Include any pap smears or colon screening)? NO      Do you have an Advanced Directive? NO    Would you like information on Advanced Directives? NO    Chief Complaint   Patient presents with    Leg Pain     right leg pain. x2 weeks.  pt described as pop in calf. shooting pain in calf when trying to run or jump [de-identified] : 72 y o  female with PMHx of vertigo, DLD, chronic back pain and DM, noncompliant with medications, and recent diagnosis of left lower extremity DVT, s/p thrombectomy and 1 month of AC, which pt stopped after at that point since she could  not afford it, now referred to us by Dr Jara to discuss further AC plan. \par Dr Jara was then able to get pt Eliquis but also referred her recently for duplex-Doppler which showed: \par \par IMPRESSION:\par No evidence of deep venous thrombosis in either lower extremity.\par Specifically, venous thrombosis of the left lower extremity has resolved.\par \par After the Doppler she was under the impression that she did not need AC. \par \par History of her DVT goes back to  3/8/22 when she was admitted with pain in her left lower back, that radiates to LLE, a/w tingling, numbness, described as sharp and dull, intermittent, occurring with and w/o movement, present for the last year, 10/10 at its intensity. Pt reports that she has  chronic pain and was taking Aleve for pain with some improvement.  Saw Dr Lang and was scheduled for OP c-spine MRI this week. \par Doppler that was done in ER showed : \par Impression:\par Acute deep vein thrombosis of the left external iliac, common femoral, deep femoral, femoral, popliteal, gastrocnemius venous sinus, anterior tibial, posterior tibial and peroneal veins.\par No evidence of superficial thrombophlebitis in the bilateral lower extremities.\par \par Pt was seen by vascular and had thrombectomy done. \par Physical exam inpatient also showed abdominal distension. So CT chest and abdomen was ordered: \par \par IMPRESSION:\par Mild right hydronephrosis extending to bulky uterine fibroids.\par Enlarged myometrial uterus, several which are calcified.\par Inflammatory changes surrounding the left iliac vessels, limited evaluation without intravenous contrast. Consider repeat CT with intravenous contrast as clinically warranted.\par \par Pt was seen by Gyn Onc and was scheduled for D & C.Pt also had US pelvis that again demonstrated:\par FINDINGS:\par Uterus: 15.2 cm x 10.3 cm x 8.3 cm. Large fibroids are seen, measuring greater than 8 cm. Areas of calcification is recognized. Please note that the fibroids obscure the endometrial echo.\par \par Neither ovary is identified\par \par Gyn Onc did D and C on 3/25 , Operative Findings enlarged 16-18 week fibroid uterus. Uterus sounded to 7cm.\par On hysteroscopy it was not clear if uterine cavity was entered. The cervix appeared to be elongated with a lesion in the endocervical canal, blocking\par entrance to the uterine cavity.\par \par Several biopsies were done. \par Specimen(s) Submitted\par 1 Endocervical lesion\par 2 Endometrial curettings\par 3 Cervix LEEP biopsy\par 4 Endocervix curettings post LEEP\par \par Final Diagnosis\par 1, Endocervical lesion:\par \par - Benign endocervical tissue which may represent a polyp.\par \par 2. endometrial curettings:\par - Benign ectocervical and endocervical tissue with mucoid material.\par - Endometrial tissue is not present.\par \par 3. LEEP of cervix:\par - No significant diagnostic abnormality.\par - No evidence of dysplasia.\par \par 4. Post LEEP endocervical curettage:\par - Mild chronic cervicitis.\par \par But no endometrial tissue was seen. Pt continued to have persistent symptoms of abdominal fullness and discomfort so was recommended to get GISELA-BSO. Which pt has been scheduled for 5/31. \par Pt reports that she has been following up with Gyn since 1980 for uterine fibroids . She went to OB/GYN in [place] last year and was told that nothing could be done\par regarding her fibroids. Denies weight loss, fatigue, f/c, sob, c/p, palpitations, n/v/d/c, urinary symptoms.\par \par Pt reports that she never had a colonoscopy done and never had a mammogram. Pap smear was done by gyn. \par Pt denies any family history of cancers or blood clots.

## 2022-05-09 ENCOUNTER — VIRTUAL VISIT (OUTPATIENT)
Dept: INTERNAL MEDICINE CLINIC | Age: 47
End: 2022-05-09
Payer: COMMERCIAL

## 2022-05-09 DIAGNOSIS — J01.10 ACUTE NON-RECURRENT FRONTAL SINUSITIS: Primary | ICD-10-CM

## 2022-05-09 DIAGNOSIS — R36.1 HEMATOSPERMIA: ICD-10-CM

## 2022-05-09 PROCEDURE — 99214 OFFICE O/P EST MOD 30 MIN: CPT | Performed by: INTERNAL MEDICINE

## 2022-05-09 RX ORDER — AZITHROMYCIN 250 MG/1
TABLET, FILM COATED ORAL
Qty: 6 TABLET | Refills: 0 | Status: SHIPPED | OUTPATIENT
Start: 2022-05-09 | End: 2022-08-12 | Stop reason: ALTCHOICE

## 2022-05-09 NOTE — PROGRESS NOTES
Benjamin Baptiste is a 42 y.o. male, evaluated via audio-only technology on 5/9/2022 for URI (X 6 days, body aches, no energy, sore throat, congestion, yellow mucous, temporal headache, only taken tylenol, no fever)  . Assessment & Plan:   Diagnoses and all orders for this visit:    1. Acute non-recurrent frontal sinusitis  -     azithromycin (ZITHROMAX) 250 mg tablet; Take 2 tablets today, then take 1 tablet daily    2. Hematospermia  -     REFERRAL TO UROLOGY        12  Subjective:     Benjamin Baptiste was evaluated through a patient-initiated, synchronous (real-time) audio only encounter. He (or guardian if applicable) is aware that it is a billable service, which includes applicable co-pays, with coverage as determined by his insurance carrier. This visit was conducted with the patient's (and/or [facility] guardian's) verbal consent. He has not had a related appointment within my department in the past 7 days or scheduled within the next 24 hours. The patient was located in a state where the provider was licensed to provide care. Total Time: minutes: 21-30 minutes    Pb Sales MD     His wife was sick 2 weeks ago with URI symptoms. She had a positive COVID home test but a negative drive-through test.  She was not treated for COVID. About 6 days ago, he noted onset of upper respiratory symptoms along with generalized achiness, tiredness and fatigue. 2 days ago, he did home COVID test which came back negative. A lot of his symptoms have improved but he's still feeling low energy at this point, he does have discolored sinus drainage. No fevers, shortness of breath, wheezing, GI complaints. On a separate note, he is asking for evaluation for hematospermia that he noted about 2 weeks ago. No UTI type symptoms, known history of stones.     Past Medical History:   Diagnosis Date    Allergic rhinitis     Anxiety and depression     Degenerative arthritis of cervical spine 2009    Dr. Pamela Quiles 2009; MRI showed C5-7 disc extrusion/spur complex w mild spinal stenosis    FHx: colon cancer     GERD with esophagitis 01/2015    Dr Ann Marie Clay EGD    H/O cardiovascular stress test     ETT neg ex time 13min (7/11); stress echo neg (5/14)    H/O echocardiogram 04/06/2012    tds, ef 60-65%, no RWMA,  RVSP 15-20 mmHg.  HSV (herpes simplex virus) infection 2000    Hyperlipidemia     Reactive hypoglycemia     5/14 3 hr GTT 40     Current Outpatient Medications   Medication Sig    azithromycin (ZITHROMAX) 250 mg tablet Take 2 tablets today, then take 1 tablet daily    triamcinolone (ARISTOCORT) 0.5 % topical cream Apply  to affected area two (2) times a day. use thin layer to affected area twice daily    FLUoxetine (PROzac) 20 mg capsule TAKE ONE CAPSULE BY MOUTH DAILY    omeprazole (PRILOSEC) 20 mg capsule Take 1 Cap by mouth daily. (Patient taking differently: Take 20 mg by mouth daily. As needed)    valACYclovir (VALTREX) 500 mg tablet Take 1 Tablet by mouth two (2) times a day. (Patient not taking: Reported on 5/9/2022)     No current facility-administered medications for this visit. Allergies   Allergen Reactions    [place] Anaphylaxis    Avelox [Moxifloxacin] Other (comments)     Felt bad    Cat Dander Itching    Dog Dander Itching     Assessment and plan:  1.  URI. Negative COVID test from home. We will empirically put on Zithromax at this point, symptomatic otc treatment as well. Call if worsening complaints  2. Hematospermia.   Referred to [facility] [place]

## 2022-05-09 NOTE — PROGRESS NOTES
Donovan Owusu presents with   Chief Complaint   Patient presents with    URI     X 6 days, body aches, no energy, sore throat, congestion, yellow mucous, temporal headache, only taken tylenol, no fever                1. \"Have you been to the ER, urgent care clinic since your last visit? Hospitalized since your last visit? \" No    2. \"Have you seen or consulted any other health care providers outside of the [address] [place] [address] since your last visit? \" No     3. For patients aged 39-70: Has the patient had a colonoscopy / FIT/ Cologuard?  NA - based on age

## 2022-05-18 DIAGNOSIS — Z00.00 PHYSICAL EXAM: ICD-10-CM

## 2022-07-08 RX ORDER — EPINEPHRINE 0.3 MG/.3ML
INJECTION SUBCUTANEOUS
Qty: 2 EACH | Refills: 5 | Status: SHIPPED | OUTPATIENT
Start: 2022-07-08

## 2022-08-01 DIAGNOSIS — R21 RASH: ICD-10-CM

## 2022-08-01 NOTE — TELEPHONE ENCOUNTER
Last OV  5/9/2022 (virtual)  Next OV 11/21/2022    Last refill   Triamcinolone 2/28/2022  Omeprazole  5/22/2020

## 2022-08-02 RX ORDER — OMEPRAZOLE 20 MG/1
20 CAPSULE, DELAYED RELEASE ORAL DAILY
Qty: 90 CAPSULE | Refills: 3 | Status: SHIPPED | OUTPATIENT
Start: 2022-08-02

## 2022-08-02 RX ORDER — TRIAMCINOLONE ACETONIDE 5 MG/G
CREAM TOPICAL 2 TIMES DAILY
Qty: 30 G | Refills: 1 | Status: SHIPPED | OUTPATIENT
Start: 2022-08-02

## 2022-08-07 ENCOUNTER — PATIENT MESSAGE (OUTPATIENT)
Dept: INTERNAL MEDICINE CLINIC | Age: 47
End: 2022-08-07

## 2022-08-08 NOTE — TELEPHONE ENCOUNTER
----- Message from Damari Vu sent at 8/7/2022 11:36 AM EDT -----  Regarding: Rash/Skin Condition   Good morning. Im following up on a previous visit where I was given steroid cream to put on a localized skin condition. My appointment with the dermatologist isnt until August 30th. The rash/condition (same one I believe) is now on my arms and legs. I attached pictures. Should I use the steroid cream on all locations until my dermatologist appointment or make a follow-up appointment with you first?    Thank you.

## 2022-08-12 ENCOUNTER — OFFICE VISIT (OUTPATIENT)
Dept: INTERNAL MEDICINE CLINIC | Age: 47
End: 2022-08-12
Payer: COMMERCIAL

## 2022-08-12 VITALS
SYSTOLIC BLOOD PRESSURE: 130 MMHG | TEMPERATURE: 97.9 F | DIASTOLIC BLOOD PRESSURE: 85 MMHG | OXYGEN SATURATION: 99 % | WEIGHT: 168 LBS | HEART RATE: 79 BPM | HEIGHT: 68 IN | RESPIRATION RATE: 16 BRPM | BODY MASS INDEX: 25.46 KG/M2

## 2022-08-12 DIAGNOSIS — M79.89 LEG SWELLING: ICD-10-CM

## 2022-08-12 DIAGNOSIS — M79.89 LEG SWELLING: Primary | ICD-10-CM

## 2022-08-12 DIAGNOSIS — T14.8XXA BRUISING: ICD-10-CM

## 2022-08-12 DIAGNOSIS — M79.662 PAIN OF LEFT CALF: ICD-10-CM

## 2022-08-12 PROCEDURE — 99214 OFFICE O/P EST MOD 30 MIN: CPT | Performed by: INTERNAL MEDICINE

## 2022-08-12 NOTE — PROGRESS NOTES
Jose Armando Knight presents with No chief complaint on file. 1. \"Have you been to the ER, urgent care clinic since your last visit? Hospitalized since your last visit? \"  YES, 8-6-22 [facility] for contusion of L calf    2. \"Have you seen or consulted any other health care providers outside of the [address] [place] [address] since your last visit? \"  YES,       3. For patients aged 39-70: Has the patient had a colonoscopy / FIT/ Cologuard?  No

## 2022-08-12 NOTE — PROGRESS NOTES
42 y.o. male who presents for evaluation. About 8 days ago, they were playing foosball on the field at a work function when he got accidentally kicked in the left posterior calf. The [facility], [facility] pain and swelling. Eval unremarkable outside of presumed soft tissue injury, D-dimer negative. He can walk but he has been noticing some bruising in the foot and also increased swelling in the calf. Not taking NSAIDs routinely. Denies any ankle pain, chest pain, shortness of breath, pleurisy, hemoptysis. He is noticing the bruising settling down in the medial and lateral aspect of the left foot. No prior history of blood clots    Past Medical History:   Diagnosis Date    Allergic rhinitis     Anxiety and depression     Degenerative arthritis of cervical spine 2009    Dr. Sandro Stephen 2009; MRI showed C5-7 disc extrusion/spur complex w mild spinal stenosis    FHx: colon cancer     GERD with esophagitis 01/2015    Dr Duc Dodd EGD    H/O cardiovascular stress test     ETT neg ex time 13min (7/11); stress echo neg (5/14)    H/O echocardiogram 04/06/2012    tds, ef 60-65%, no RWMA,  RVSP 15-20 mmHg. HSV (herpes simplex virus) infection 2000    Hyperlipidemia     Reactive hypoglycemia     5/14 3 hr GTT 40     Current Outpatient Medications   Medication Sig    omeprazole (PRILOSEC) 20 mg capsule Take 1 Capsule by mouth in the morning. triamcinolone (ARISTOCORT) 0.5 % topical cream Apply  to affected area two (2) times a day. use thin layer to affected area twice daily    EPINEPHrine (EPIPEN) 0.3 mg/0.3 mL injection INJECT INTO THE MIDDLE OF THE OUTER THIGH AND HOLD FOR 3 SECONDS AS NEEDED FOR SEVERE ALLERGIC REACTION THEN CALL 911 IF USED.    valACYclovir (VALTREX) 500 mg tablet Take 1 Tablet by mouth two (2) times a day. FLUoxetine (PROzac) 20 mg capsule TAKE ONE CAPSULE BY MOUTH DAILY     No current facility-administered medications for this visit.      Allergies   Allergen Reactions    [facility] Anaphylaxis    Avelox [Moxifloxacin] Other (comments)     Felt bad    Cat Dander Itching    Dog Dander Itching     Mild swelling noted in the left calf extending into the left foot. Clearly more enlarged compared to the right. Pulses 2+ DP and PT. Negative Homans' sign. Some bruising settling down into the medial and lateral aspects of the foot. Assessment and plan:  1. Leg trauma. Discussed utility of getting MRI of the calf, he decided to hold off. Take NSAIDs as needed, elevate. 2.  R/o DVT. With increased swelling, we discussed the possibility of a blood clot forming since the last evaluation. Options of D-dimer versus straight to duplex study discussed, he wants to do the blood work first.  If elevated, will check venous duplex and go from there. Symptoms that would prompt ER evaluation reviewed        Instructions above were handwritten on the lab results sheet that I gave the patient today    Above conditions discussed at length and patient vocalized understanding. All questions answered to patient satisfaction        ICD-10-CM ICD-9-CM    1. Leg swelling  M79.89 729.81 D DIMER      2. Pain of left calf  M79.662 729.5       3. Bruising  T14. 8XXA 924.9

## 2022-08-13 LAB — D DIMER PPP FEU-MCNC: 0.66 MG/L FEU (ref 0–0.49)

## 2022-08-16 DIAGNOSIS — M79.89 LEG SWELLING: ICD-10-CM

## 2022-08-19 ENCOUNTER — HOSPITAL ENCOUNTER (OUTPATIENT)
Dept: VASCULAR SURGERY | Age: 47
Discharge: HOME | End: 2022-08-19
Attending: INTERNAL MEDICINE
Payer: COMMERCIAL

## 2022-08-19 DIAGNOSIS — M79.89 LEG SWELLING: ICD-10-CM

## 2022-08-19 PROCEDURE — 93971 EXTREMITY STUDY: CPT

## 2022-08-22 ENCOUNTER — TELEPHONE (OUTPATIENT)
Dept: INTERNAL MEDICINE CLINIC | Age: 47
End: 2022-08-22

## 2022-08-22 NOTE — TELEPHONE ENCOUNTER
Pls call    DVT study neg    No evidence of acute deep vein thrombosis in the left common femoral, femoral, popliteal, posterior tibial, and peroneal veins. The veins were imaged in the transverse and longitudinal planes. The vessels showed normal color filling and compressibility. Doppler interrogation showed phasic and spontaneous flow. No evidence of deep or superficial vein thrombosis noted in the Left lower extremity or right common femoral vein.

## 2022-09-12 RX ORDER — FLUOXETINE HYDROCHLORIDE 20 MG/1
CAPSULE ORAL
Qty: 90 CAPSULE | Refills: 3 | Status: SHIPPED | OUTPATIENT
Start: 2022-09-12

## 2022-11-14 ENCOUNTER — APPOINTMENT (OUTPATIENT)
Dept: INTERNAL MEDICINE CLINIC | Age: 47
End: 2022-11-14

## 2022-11-15 NOTE — PROGRESS NOTES
41 y.o. white male who presents for RPE    The prozac continues to control his sx     No cardiovascular complaints. Not able to exercise with his work schedule. Weight is up, he has not been following his bp    No gi or gu issues. He is using ppi prn and doing avoidance measures. He did not go for the colo and requesting another referral    He is seeing [facility] for some rashes but not responding to topicals, he has follow up appt early Dec    Allergies are flaring but not taking antihistamines at this time    Past Medical History:   Diagnosis Date    Allergic rhinitis     Anxiety and depression     Degenerative arthritis of cervical spine 2009    Dr. Lainez Para 2009; MRI showed C5-7 disc extrusion/spur complex w mild spinal stenosis    FHx: colon cancer     GERD with esophagitis 01/2015    Dr Elder Long EGD    H/O cardiovascular stress test     ETT neg ex time 13min (7/11); stress echo neg (5/14)    H/O echocardiogram 04/06/2012    tds, ef 60-65%, no RWMA,  RVSP 15-20 mmHg. HSV (herpes simplex virus) infection 2000    Hyperlipidemia     calc 10 yr risk score 2.3% (11/22)    Reactive hypoglycemia     5/14 3 hr GTT 40     Past Surgical History:   Procedure Laterality Date    HX APPENDECTOMY  1/14    Dr Demi Long 1/9/15 negative    HX VASECTOMY  07/2020    Dr Alondra Willson  8/14    EEG negative Dr Luz Ignacio  2014    tilt table negative Dr Alfredo Ornelas History     Socioeconomic History    Marital status:      Spouse name: Not on file    Number of children: 3    Years of education: Not on file    Highest education level: Not on file   Occupational History    Occupation: 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[employer]     Employer: [employer]     Employer: [employer]   Tobacco Use    Smoking status: Never    Smokeless tobacco: Never   Substance and Sexual Activity    Alcohol use:  Yes     Alcohol/week: 6.7 standard drinks     Types: 8 Cans of beer per week     Comment: 5-6 per week    Drug use: No    Sexual activity: Yes     Partners: Female   Other Topics Concern    Not on file   Social History Narrative    Not on file     Social Determinants of Health     Financial Resource Strain: Not on file   Food Insecurity: Not on file   Transportation Needs: Not on file   Physical Activity: Not on file   Stress: Not on file   Social Connections: Not on file   Intimate Partner Violence: Not on file   Housing Stability: Not on file     Family History   Problem Relation Age of Onset    Other Mother         lupus    Diabetes Father     Cancer Father 67        prostate cancer    Cancer Maternal Uncle         lung    Heart Disease Maternal Grandfather     Cancer Maternal Uncle         brain tumor     Immunization History   Administered Date(s) Administered    COVID-19, PFIZER PURPLE top, DILUTE for use, (age 15 y+), IM, 30mcg/0.3mL 07/01/2021, 07/22/2021    Influenza Vaccine PF 10/14/2013    Influenza, FLUARIX, FLULAVAL, FLUZONE (age 10 mo+) AND AFLURIA, (age 1 y+), PF, 0.5mL 09/26/2016     Current Outpatient Medications   Medication Sig    FLUoxetine (PROzac) 20 mg capsule TAKE ONE CAPSULE BY MOUTH DAILY    omeprazole (PRILOSEC) 20 mg capsule Take 1 Capsule by mouth in the morning. triamcinolone (ARISTOCORT) 0.5 % topical cream Apply  to affected area two (2) times a day. use thin layer to affected area twice daily    EPINEPHrine (EPIPEN) 0.3 mg/0.3 mL injection INJECT INTO THE MIDDLE OF THE OUTER THIGH AND HOLD FOR 3 SECONDS AS NEEDED FOR SEVERE ALLERGIC REACTION THEN CALL 911 IF USED.    valACYclovir (VALTREX) 500 mg tablet Take 1 Tablet by mouth two (2) times a day. No current facility-administered medications for this visit.      Allergies   Allergen Reactions    [place] Anaphylaxis    Avelox [Moxifloxacin] Other (comments)     Felt bad    Cat Dander Itching    Dog Dander Itching     REVIEW OF SYSTEMS: juany 1/15  [place]  Ophtho - no vision change or eye pain  Oral - no mouth pain, tongue or tooth problems  Ears - no hearing loss, ear pain, fullness, no swallowing problems  Cardiac - no CP, PND, orthopnea, edema, palpitations or syncope  Chest - no breast masses  Resp - no wheezing, chronic coughing, dyspnea  GI - no heartburn, nausea, vomiting, change in bowel habits, bleeding, hemorrhoids  Urinary - no dysuria, hematuria, flank pain, urgency, frequency    Visit Vitals  /78 (BP 1 Location: Right upper arm, BP Patient Position: Sitting)   Pulse 74   Temp 97.5 °F (36.4 °C) (Temporal)   Wt 173 lb 9.6 oz (78.7 kg)   SpO2 99%   BMI 26.40 kg/m²     A&O x3  Affect is appropriate. Mood stable  No apparent distress  Anicteric, no JVD, adenopathy or thyromegaly. No carotid bruits or radiated murmur  Lungs clear to auscultation, no wheezes or rales  Heart showed regular rate and rhythm. No murmur, rubs, gallops  Abdomen soft nontender, no hepatosplenomegaly or masses. Extremities without edema.   Pulses 1-2+ symmetrically  Eczematous rash bilat hands    LABS  From 11/10 showed gluc 79,  cr 0.90, gfr>60,   alt 27,                 wbc 7.4, hb 14.9, plt 212, ua neg, tsh 1.38, uds neg  From 3/12 showed   gluc 94,   cr 0.77, gfr 118,      chol 163, tg 60,   hdl 49, ldl-c 102, wbc 4.1, hb 14.2, plt 206, ua neg  From 7/13 showed   gluc 93,   cr 0.85, gfr 113, alt 15, chol 170, tg 112, hdl 46, ldl-c 102,           ua neg  From 11/13 showed                      ck/trop-  From 1/14 showed   gluc 131, cr 0.91, gfr>60,                  wbc 5.6, hb 11.9, plt 138, ua neg  From 8/14 showed                      tsh 1.37, lyme neg  From 11/14 showed gluc 93,   cr 0.65, gfr>60,  alt 10, chol 172, tg 62,   hdl 49, ldl-c 111, wbc 5.0, hb 14.6, plt 216  From 2/17 showed   gluc 93,   cr 0.75, gfr 116, alt 13, chol 210, tg 155, hdl 45, ldl-c 134, wbc 5.8, hb 14.3, plt 259,         tsh 2.06  From 3/18 showed   gluc 93,   cr 0.84, gfr 110, alt 7, chol 186, tg 92,   hdl 47, ldl-c 121  From 3/19 showed   gluc 91,   cr 0.90, gfr>60,  alt 10, chol 205, tg 82,   hdl 52, ldl-c 137, wbc 4.1, hb 13.7, plt 209  From 11/20 showed gluc 90,   cr 0.90, gfr>60,  alt 16, chol 224, tg 78,   hdl 63, ldl-c 147, wbc 4.2, hb 15.2, plt 224, ua neg, tsh 2.38  From 11/21 showed gluc 83,   cr 0.83, gfr>60,  alt 15, chol 213, tg 100, hdl 59, ldl-c 136, wbc 4.0, hb 14.5, plt 206  From 11/22 showed gluc 90,   cr 0.98, gfr>60,  alt 17, chol 215, tg 117, hdl 50, ldl-c 144, wbc 5.4, hb 14.5, plt 236      We reviewed the patient's labs from the last several visits to point out trends in the numbers        Patient Active Problem List   Diagnosis Code    Anxiety F41.9    Hyperlipidemia E78.5    GERD with esophagitis Dr Laura Skelton K21.00     Assessment and plan:  1. GERD. PPI and avoidance measures  2. Anxiety. Continue prozac  3. FHx colo ca. Referral sent  4. HLP. Lifestyle and dietary measures, gave mediterranean diet sheet previously; try to inc exercise program  5. Elevated bp without dx of htn. As per #4  6. Rash. Per derm        RTC 11/23    Above conditions discussed at length and patient vocalized understanding. All questions answered to patient satisfaction        ICD-10-CM ICD-9-CM    1. Physical exam  Z00.00 V70.9 CBC W/O DIFF      METABOLIC PANEL, COMPREHENSIVE      LIPID PANEL      2. FHx: colon cancer  Z80.0 V16.0 REFERRAL TO GASTROENTEROLOGY      3. Anxiety  F41.9 300.00       4. Hyperlipidemia, unspecified hyperlipidemia type  E78.5 272.4       5. Gastroesophageal reflux disease with esophagitis without hemorrhage  K21.00 530.81      530.10       6.  Rash  R21 782.1

## 2022-11-16 NOTE — PROGRESS NOTES
Kaela Dye presents today for No chief complaint on file. Physical w lab 11-14-22  GERD w esophagitis   Anxiety   Hyperlipidemia     1. \"Have you been to the ER, urgent care clinic since your last visit? Hospitalized since your last visit? \" no    2. \"Have you seen or consulted any other health care providers outside of the [address] [place] [address] since your last visit? \" no     3. For patients aged 39-70: Has the patient had a colonoscopy / FIT/ Cologuard? No      If the patient is female:    4. For patients aged 41-77: Has the patient had a mammogram within the past 2 years? NA - based on age or sex  See top three    5. For patients aged 21-65: Has the patient had a pap smear?  NA - based on age or sex

## 2022-11-21 ENCOUNTER — OFFICE VISIT (OUTPATIENT)
Dept: INTERNAL MEDICINE CLINIC | Age: 47
End: 2022-11-21
Payer: COMMERCIAL

## 2022-11-21 VITALS
BODY MASS INDEX: 26.4 KG/M2 | SYSTOLIC BLOOD PRESSURE: 134 MMHG | WEIGHT: 173.6 LBS | HEART RATE: 74 BPM | TEMPERATURE: 97.5 F | OXYGEN SATURATION: 99 % | DIASTOLIC BLOOD PRESSURE: 78 MMHG

## 2022-11-21 DIAGNOSIS — K21.00 GASTROESOPHAGEAL REFLUX DISEASE WITH ESOPHAGITIS WITHOUT HEMORRHAGE: ICD-10-CM

## 2022-11-21 DIAGNOSIS — R21 RASH: ICD-10-CM

## 2022-11-21 DIAGNOSIS — Z00.00 PHYSICAL EXAM: Primary | ICD-10-CM

## 2022-11-21 DIAGNOSIS — Z80.0 FHX: COLON CANCER: ICD-10-CM

## 2022-11-21 DIAGNOSIS — E78.5 HYPERLIPIDEMIA, UNSPECIFIED HYPERLIPIDEMIA TYPE: ICD-10-CM

## 2022-11-21 DIAGNOSIS — F41.9 ANXIETY: ICD-10-CM

## 2022-11-21 PROCEDURE — 99396 PREV VISIT EST AGE 40-64: CPT | Performed by: INTERNAL MEDICINE

## 2022-11-21 NOTE — LETTER
NOTIFICATION RETURN TO WORK / SCHOOL    11/21/2022 8:51 AM    Mr. Bridgett Arshad  [id number] [id number]      To Whom It May Concern:    Bridgett Arshad is currently under the care of Ismael Silva. He will return to work/school on: November 22, 2022    If there are questions or concerns please have the patient contact our office.         Sincerely,      Jacqueline Oconnell MD

## 2022-11-21 NOTE — PROGRESS NOTES
Pt denies any complaints today. 1. \"Have you been to the ER, urgent care clinic since your last visit? Hospitalized since your last visit? \" No    2. \"Have you seen or consulted any other health care providers outside of the [address] [place] [address] since your last visit? \" No     3. For patients aged 39-70: Has the patient had a colonoscopy / FIT/ Cologuard? No      If the patient is female:    4. For patients aged 41-77: Has the patient had a mammogram within the past 2 years? NA - based on age or sex      11. For patients aged 21-65: Has the patient had a pap smear?  NA - based on age or sex

## 2022-12-01 ENCOUNTER — PATIENT MESSAGE (OUTPATIENT)
Dept: INTERNAL MEDICINE CLINIC | Age: 47
End: 2022-12-01

## 2022-12-02 NOTE — TELEPHONE ENCOUNTER
Pt also calling this am. Sx since yesterday of redness, this morning he also has closed eye and mucous. Pharmacy is [address]      Please advise

## 2022-12-02 NOTE — TELEPHONE ENCOUNTER
Patient is calling again to see if something can be sent in to [facility] for pink eye.

## 2022-12-27 ENCOUNTER — HOSPITAL ENCOUNTER (OUTPATIENT)
Dept: GENERAL RADIOLOGY | Age: 47
Discharge: HOME | End: 2022-12-27
Payer: COMMERCIAL

## 2022-12-27 ENCOUNTER — VIRTUAL VISIT (OUTPATIENT)
Dept: INTERNAL MEDICINE CLINIC | Age: 47
End: 2022-12-27
Payer: COMMERCIAL

## 2022-12-27 DIAGNOSIS — J40 BRONCHITIS: ICD-10-CM

## 2022-12-27 DIAGNOSIS — R05.3 CHRONIC COUGH: Primary | ICD-10-CM

## 2022-12-27 DIAGNOSIS — R05.3 CHRONIC COUGH: ICD-10-CM

## 2022-12-27 PROCEDURE — 99213 OFFICE O/P EST LOW 20 MIN: CPT | Performed by: INTERNAL MEDICINE

## 2022-12-27 PROCEDURE — 71046 X-RAY EXAM CHEST 2 VIEWS: CPT

## 2022-12-27 RX ORDER — AZITHROMYCIN 250 MG/1
TABLET, FILM COATED ORAL
Qty: 6 TABLET | Refills: 0 | Status: SHIPPED | OUTPATIENT
Start: 2022-12-27

## 2022-12-27 RX ORDER — ALBUTEROL SULFATE 90 UG/1
2 AEROSOL, METERED RESPIRATORY (INHALATION)
Qty: 1 EACH | Refills: 1 | Status: SHIPPED | OUTPATIENT
Start: 2022-12-27

## 2022-12-27 RX ORDER — HYDROCODONE POLISTIREX AND CHLORPHENIRAMINE POLISTIREX 10; 8 MG/5ML; MG/5ML
5 SUSPENSION, EXTENDED RELEASE ORAL
Qty: 70 ML | Refills: 0 | Status: SHIPPED | OUTPATIENT
Start: 2022-12-27 | End: 2023-01-03

## 2022-12-27 NOTE — PROGRESS NOTES
Harle Opitz is a 41 y.o. male who was seen by synchronous (real-time) audio-video technology on 12/27/2022 for Cough        Assessment & Plan:   Diagnoses and all orders for this visit:    1. Chronic cough  -     HYDROcodone-chlorpheniramine (TUSSIONEX) 10-8 mg/5 mL suspension; Take 5 mL by mouth every twelve (12) hours as needed for Cough for up to 7 days. Max Daily Amount: 10 mL. -     XR CHEST PA LAT; Future    2. Bronchitis  -     azithromycin (ZITHROMAX) 250 mg tablet; Take 2 tablets today, then take 1 tablet daily  -     albuterol (PROVENTIL HFA, VENTOLIN HFA, PROAIR HFA) 90 mcg/actuation inhaler; Take 2 Puffs by inhalation every six (6) hours as needed for Wheezing.  -     HYDROcodone-chlorpheniramine (TUSSIONEX) 10-8 mg/5 mL suspension; Take 5 mL by mouth every twelve (12) hours as needed for Cough for up to 7 days. Max Daily Amount: 10 mL. 712  Subjective:       Prior to Admission medications    Medication Sig Start Date End Date Taking? Authorizing Provider   azithromycin (ZITHROMAX) 250 mg tablet Take 2 tablets today, then take 1 tablet daily 12/27/22  Yes Kyle Patino MD   albuterol (PROVENTIL HFA, VENTOLIN HFA, PROAIR HFA) 90 mcg/actuation inhaler Take 2 Puffs by inhalation every six (6) hours as needed for Wheezing. 12/27/22  Yes Kyle Patino MD   HYDROcodone-chlorpheniramine (TUSSIONEX) 10-8 mg/5 mL suspension Take 5 mL by mouth every twelve (12) hours as needed for Cough for up to 7 days. Max Daily Amount: 10 mL. 12/27/22 1/3/23 Yes Kyle Patino MD   gentamicin (GARAMYCIN) 0.3 % ophthalmic solution Administer 1 Drop to both eyes every four (4) hours. 12/5/22   Kyle Patino MD   FLUoxetine (PROzac) 20 mg capsule TAKE ONE CAPSULE BY MOUTH DAILY 9/12/22   Kyle Patino MD   omeprazole (PRILOSEC) 20 mg capsule Take 1 Capsule by mouth in the morning.  8/2/22   Kyle Patino MD   triamcinolone (ARISTOCORT) 0.5 % topical cream Apply  to affected area two (2) times a day. use thin layer to affected area twice daily 8/2/22   Dick Lock MD   EPINEPHrine (EPIPEN) 0.3 mg/0.3 mL injection INJECT INTO THE MIDDLE OF THE OUTER THIGH AND HOLD FOR 3 SECONDS AS NEEDED FOR SEVERE ALLERGIC REACTION THEN CALL 911 IF USED. 7/8/22   Dick Lock MD   valACYclovir (VALTREX) 500 mg tablet Take 1 Tablet by mouth two (2) times a day. 2/16/22   Dick Lock MD         ROS    Objective:     Patient-Reported Vitals 5/9/2022   Patient-Reported Weight 165   Patient-Reported Height 5'8\"      General: alert, cooperative, no distress   Mental  status: normal mood, behavior, speech, dress, motor activity, and thought processes, able to follow commands   HENT: NCAT   Neck: no visualized mass   Resp: no respiratory distress   Neuro: no gross deficits   Skin: no discoloration or lesions of concern on visible areas   Psychiatric: normal affect, consistent with stated mood, no evidence of hallucinations     Additional exam findings: We discussed the expected course, resolution and complications of the diagnosis(es) in detail. Medication risks, benefits, costs, interactions, and alternatives were discussed as indicated. I advised him to contact the office if his condition worsens, changes or fails to improve as anticipated. He expressed understanding with the diagnosis(es) and plan. Charanjit Jean, was evaluated through a synchronous (real-time) audio-video encounter. The patient (or guardian if applicable) is aware that this is a billable service, which includes applicable co-pays. This Virtual Visit was conducted with patient's (and/or legal guardian's) consent. The visit was conducted pursuant to the emergency declaration under the [facility], [address] [place] [address] authority and the [employer] General Act. Patient identification was verified, and a caregiver was present when appropriate.   The patient was located at: Home: [facility]  [id number]  The provider was located at: Facility (Appt Department): [facility]        Olvin Chawla MD    He reports having had a cough for upwards of 3 to 4 weeks now. The last couple weeks, it has gotten productive with yellowish material occasionally brown. He has not not had shortness of breath but has noted some wheezing, no measured fevers. Denies any ear pain or sinus symptoms. No known exposures. Been using OTC meds with minimal relief, has difficulty falling asleep at night. Past Medical History:   Diagnosis Date    Allergic rhinitis     Anxiety and depression     Degenerative arthritis of cervical spine 2009    Dr. Dowling Sensor 2009; MRI showed C5-7 disc extrusion/spur complex w mild spinal stenosis    FHx: colon cancer     GERD with esophagitis 01/2015    Dr Tarun Clifton EGD    H/O cardiovascular stress test     ETT neg ex time 13min (7/11); stress echo neg (5/14)    H/O echocardiogram 04/06/2012    tds, ef 60-65%, no RWMA,  RVSP 15-20 mmHg. HSV (herpes simplex virus) infection 2000    Hyperlipidemia     calc 10 yr risk score 2.3% (11/22)    Reactive hypoglycemia     5/14 3 hr GTT 40     Current Outpatient Medications   Medication Sig    azithromycin (ZITHROMAX) 250 mg tablet Take 2 tablets today, then take 1 tablet daily    albuterol (PROVENTIL HFA, VENTOLIN HFA, PROAIR HFA) 90 mcg/actuation inhaler Take 2 Puffs by inhalation every six (6) hours as needed for Wheezing. HYDROcodone-chlorpheniramine (TUSSIONEX) 10-8 mg/5 mL suspension Take 5 mL by mouth every twelve (12) hours as needed for Cough for up to 7 days. Max Daily Amount: 10 mL. gentamicin (GARAMYCIN) 0.3 % ophthalmic solution Administer 1 Drop to both eyes every four (4) hours.     FLUoxetine (PROzac) 20 mg capsule TAKE ONE CAPSULE BY MOUTH DAILY    omeprazole (PRILOSEC) 20 mg capsule Take 1 Capsule by mouth in the morning. triamcinolone (ARISTOCORT) 0.5 % topical cream Apply  to affected area two (2) times a day. use thin layer to affected area twice daily    EPINEPHrine (EPIPEN) 0.3 mg/0.3 mL injection INJECT INTO THE MIDDLE OF THE OUTER THIGH AND HOLD FOR 3 SECONDS AS NEEDED FOR SEVERE ALLERGIC REACTION THEN CALL 911 IF USED.    valACYclovir (VALTREX) 500 mg tablet Take 1 Tablet by mouth two (2) times a day. No current facility-administered medications for this visit. Allergies   Allergen Reactions    Oak Anaphylaxis    Avelox [Moxifloxacin] Other (comments)     Felt bad    Cat Dander Itching    Dog Dander Itching     Assessment and plan:  1. Bronchitis, rule out pneumonia. Chest x-ray to be done. Empiric Z-Bakari given, along with albuterol for the wheezing and Tussionex for the cough. Continue OTC meds for symptom relief. Call with an update        Above conditions discussed at length and patient vocalized understanding.   All questions answered to patient satisfaction

## 2022-12-31 ENCOUNTER — TELEPHONE (OUTPATIENT)
Dept: INTERNAL MEDICINE CLINIC | Age: 47
End: 2022-12-31

## 2023-01-05 NOTE — TELEPHONE ENCOUNTER
M for the patient to return my call regarding their results.     Per, Dr. Tabitha Madsen:     Pls call     CXR neg    [address] [place] [address]

## 2023-01-26 ENCOUNTER — HOSPITAL ENCOUNTER (OUTPATIENT)
Dept: PHYSICAL THERAPY | Age: 48
Discharge: HOME | End: 2023-01-26
Payer: COMMERCIAL

## 2023-01-26 PROCEDURE — 97161 PT EVAL LOW COMPLEX 20 MIN: CPT

## 2023-01-26 NOTE — PROGRESS NOTES
[facility]  [address] [facility] [address]  [place], [address]  [phone] fax    Plan of Care/ Statement of Necessity for Physical Therapy Services    Patient name: Laurel Rice Start of Care: 2023   Referral source: Charles Harrison MD : 1975    Medical Diagnosis: Neck pain [M54.2]  Payor: Darryl Mcclure / Plan: Gabby Krueger / Product Type: HMO /  Onset Date: 23    Treatment Diagnosis: cervical pain   Prior Hospitalization: see medical history Provider#: [id number]   Medications: Verified on Patient summary List    Comorbidities: Allergies, Anxiety or panic disorders, Sleep dysfunction    Prior Level of Function: Patient has had intermittent issues with cervical pain in the past. Patient works as a principal.      The Plan of Care and following information is based on the information from the initial evaluation. Assessment/ key information: Patient presents with neck pain that began on 23 with an insidious onset. Patient describes cervical pain as intermittent sharp pain and is predominately at left cervical/UT region. Patient has increased difficulty with turning his head, looking down to read, and sleeping on his stomach. Patient denies numbness/tingling down (B) UE, no headaches, and no weakness/clumsiness in hands or . Patient exhibits decreased cervical AROM, decreased left shoulder strength and AROM into left abduction, decreased scapular strength, thoracic hypomobility, and increased tenderness/tightness at left cervical paraspinals/UT/levator scap region. Patient responded well to activity done during the evaluation as left cervical AROM rotation improved from 33deg to 57deg at the end. Patient demonstrates potential to make functional gains within a reasonable time frame. Patient would benefit from skilled PT to address above deficits and assist with return to PLOF. Patient requested to do PT only 1x/week.  Plan to update HEP regularly for patient to continue to progress at home. Evaluation Complexity History MEDIUM  Complexity : 1-2 comorbidities / personal factors will impact the outcome/ POC ; Examination MEDIUM Complexity : 3 Standardized tests and measures addressing body structure, function, activity limitation and / or participation in recreation  ;Presentation LOW Complexity : Stable, uncomplicated  ;Clinical Decision Making MEDIUM Complexity : FOTO score of 26-74  Overall Complexity Rating: LOW   Problem List: pain affecting function, decrease ROM, decrease strength, decrease ADL/ functional abilitiies, decrease activity tolerance, decrease flexibility/ joint mobility, and decrease transfer abilities   Treatment Plan may include any combination of the following: Therapeutic exercise, Neuromuscular reeducation, Manual therapy, Therapeutic activity, Self care/home management, Electric stim unattended , and Electric stim attended  Patient / Family readiness to learn indicated by: asking questions, trying to perform skills, and interest  Persons(s) to be included in education: patient (P)  Barriers to Learning/Limitations: None  Patient Goal (s): Relieve pain, increased mobility  Patient Self Reported Health Status: fair  Rehabilitation Potential: good    Short Term Goals: To be accomplished in 3 weeks:   1. Patient will be independent and compliant with HEP 1-2x/day to increase ease with ADLs safely. Eval: HEP established   2. Patient will improve left shoulder abduction AROM to 150deg without pain to increase ease with overhead acitivities. Eval: Left shoulder AROM abduction: 137deg with report of neck pain. Long Term Goals: To be accomplished in 6 weeks:   1. Patient will improve FOTO to at least 74 to demonstrate improved function. Eval; FOTO: 63  2. Patient will improve cervical AROM rotation (B) to 70deg to increase ease with driving. Eval: Cervical AROM rotation: right: 57deg, left: 33deg   3.  Patient will be able to perform cervical flexion AROM to [facility] with no more then 1/10 pain level to increase ease with reading. Eval: Cervical AROM flexion: 33deg (3/10 pain level)   Frequency / Duration: Patient to be seen 1 times per week for 6 weeks. Patient/ Caregiver education and instruction: Diagnosis, prognosis, exercises   [x]  Plan of care has been reviewed with PATRICIA Nava, PT 1/26/2023 7:55 AM    ________________________________________________________________________    I certify that the above Therapy Services are being furnished while the patient is under my care. I agree with the treatment plan and certify that this therapy is necessary.     [de-identified] Signature:____________Date:_________TIME:________     Josias Borges MD  ** Signature, Date and Time must be completed for valid certification **    Please sign and return to [facility] [address] [place] [address] & [facility]  [address]  [place], [address]  [phone] fax

## 2023-01-26 NOTE — PROGRESS NOTES
PT DAILY TREATMENT NOTE/CERVICAL [id number]    Patient Name: Justo Abdullahi  Date:2023  : 1975  [x]  Patient  Verified  Payor: Jeanette Owen / Plan: Lilliam Godoy / Product Type: HMO /    In time: 8:08  Out time:8:50  Total Treatment Time (min): 42  Visit #: 1 of 6    Medicare/BCBS Only   Total Timed Codes (min):  10 1:1 Treatment Time:  42     Treatment Area: Neck pain [M54.2]    SUBJECTIVE  Pain Level (0-10 scale): 1-210   []constant [x]intermittent []improving []worsening []no change since onset    Any medication changes, allergies to medications, adverse drug reactions, diagnosis change, or new procedure performed?: [x] No    [] Yes (see summary sheet for update)  Subjective functional status/changes:     PLOF: Patient has had intermittent issues with cervical pain in the past. Patient works as a principal.   Limitations to PLOF: Patient exhibits decreased cervical AROM, decreased left shoulder strength and AROM into left abduction, decreased scapular strength, thoracic hypomobility, and increased tenderness/tightness to left cervical paraspinals/UT/levator scap region. Patient responded well to activity done during the evaluation as left cervical AROM rotation improved from 33deg to 57deg at the end. Patient demonstrates potential to make functional gains within a reasonable time frame. Patient would benefit from skilled PT to address above deficits and assist with return to PLOF. Patient requested to do PT only 1x/week. Plan to update HEP regularly for patient to continue to progress at home. Mechanism of Injury: Patient presents with neck pain that began on 23 with an insidious onset. Current symptoms/Complaints: Patient describes cervical pain as intermittent sharp pain and is predominately at left cervical/UT region. Patient has increased difficulty with turning his head, looking down to read, and sleeping on his stomach.  Patient denies numbnes/tingling down (B) UE, no headaches, and no weakness/clumsiness in hands or . Previous Treatment/Compliance: Prior PT for Neck. (Last time was 4-5 years ago). Per patient X-rays 12 years ago was told degeneration. No recent x-ray or MRI  PMHx/Surgical Hx: No previous neck/shoulder/UE/back surg. No pacemaker, no cardiac issues, and no cancer. Work Hx: Patient is a principal   Pt Goals: \"Relieve pain, increased mobility\"   Cognition: A & O x 2    Other:    OBJECTIVE/EXAMINATION    32 min [x]Eval                  []Re-Eval     10 min Therapeutic Exercise:  [x] See flow sheet : HEP creation and review    Rationale: increase ROM and increase strength to improve the patients ability to perform ADls safely.      With   [] TE   [] TA   [] neuro   [] other: Patient Education: [x] Review HEP    [] Progressed/Changed HEP based on:   [] positioning   [] body mechanics   [] transfers   [] heat/ice application    [] other:      Other Objective/Functional Measures:     Physical Therapy Evaluation Cervical Spine     OBJECTIVE  Posture: [] WNL  Head Position: fwd   Shoulder/Scapular Position: rounded     Cervical Retraction: [] WNL    [x] Abnormal: increased tightness reported     Shoulder/Scapular Screen: [] WNL    [x] Abnormal: shoulder AROM right: WFL left:WFL;  abduction: right:WFL left: 137 (neck discomfort) Strength: flexion and scaption: Right: 4+/5 left: 4/5 (patient is right hand dominant)     Active Movements: [] N/A   [] Too acute   [] Other:  ROM % AROM % PROM Comments:pain, area   Forward flexion 33  3/10   Extension 41  2-3/10 pain level in all directions    SB right 25     SB left  20     Rotation right 57     Rotation left 33       Thoracic Spine: [] N/A    [] WNL   [x] Other: thoracic hypomobility noted     Palpation:  [] Min  [x] Mod  [] Severe    Location: TTP/tightness at left cervical paraspinal/UT/levator scap region     Muscle Flexibility: [] N/A   Upper Trap: [] WNL    [x] Tight    [] R    [x] L   Levator: [] WNL    [x] Tight    [] R [x] L   Pect. Minor: [] WNL    [x] Tight    [] R    [x] L    Global Muscular Weakness: [] N/A   Lower Trap: Right: 4-/5, left: 3-/5    Rhomboids: right: 4-/5, left: 3+/5    Middle Trap: right: 4/5 left: 4-/5     Other tests/comments:   Chin tuck and lift and hold: 30 seconds and patient reported no pain. Pain Level (0-10 scale) post treatment: 1-2/10     ASSESSMENT/Changes in Function: See POC. At end of the evaluation cervical AROM rotation improved from 33deg to 57deg to the left. Advised patient to perform HEP gently and to stop if pain increases. Patient will continue to benefit from skilled PT services to modify and progress therapeutic interventions, address functional mobility deficits, address ROM deficits, address strength deficits, analyze and address soft tissue restrictions, analyze and cue movement patterns, analyze and modify body mechanics/ergonomics, and assess and modify postural abnormalities to attain remaining goals.      [x]  See Plan of Care  []  See progress note/recertification  []  See Discharge Summary         Progress towards goals / Updated goals:  See POC     PLAN  []  Upgrade activities as tolerated     [x]  Continue plan of care  []  Update interventions per flow sheet       []  Discharge due to:_  []  Other:_      Kristin Welch, PT 1/26/2023  8:10 AM

## 2023-02-02 ENCOUNTER — HOSPITAL ENCOUNTER (OUTPATIENT)
Dept: PHYSICAL THERAPY | Age: 48
Discharge: HOME | End: 2023-02-02
Payer: COMMERCIAL

## 2023-02-02 PROCEDURE — 97530 THERAPEUTIC ACTIVITIES: CPT

## 2023-02-02 PROCEDURE — 97110 THERAPEUTIC EXERCISES: CPT

## 2023-02-02 PROCEDURE — 97112 NEUROMUSCULAR REEDUCATION: CPT

## 2023-02-02 NOTE — PROGRESS NOTES
PT DAILY TREATMENT NOTE     Patient Name: Renee Cleaning  Date:2023  : 1975  [x]  Patient  Verified  Payor: Lm Schmidt / Plan: Sagar Khan / Product Type: HMO /    In time:756am  Out time:841am  Total Treatment Time (min): 45  Visit #: 2 of 6    Medicare/BCBS Only   Total Timed Codes (min):  45 1:1 Treatment Time:  40       Treatment Area: Neck pain [M54.2]    SUBJECTIVE  Pain Level (0-10 scale): 0-1  Any medication changes, allergies to medications, adverse drug reactions, diagnosis change, or new procedure performed?: [x] No    [] Yes (see summary sheet for update)  Subjective functional status/changes:   [] No changes reported  Reports not doing HEP \"as much as I should. \"    OBJECTIVE     20 min Therapeutic Exercise:  [x] See flow sheet :   Rationale: increase ROM and increase strength to improve the patients ability to manage ADLs with reduced pain. 12 min Neuromuscular Re-education:  [x]  See flow sheet :   Rationale: increase strength and improve coordination  to improve the patients ability to perform ADLs with improved postural awareness and reduced pain provocation. 8 min Manual Therapy:  pt supine -- DTM and TrPR left > right upper trapezius   The manual therapy interventions were performed at a separate and distinct time from the therapeutic activities interventions. Rationale: decrease pain, increase ROM, increase tissue extensibility, and decrease trigger points to manage ADLs. With   [] TE   [] TA   [] neuro   [] other: Patient Education: [x] Review HEP    [] Progressed/Changed HEP based on:   [] positioning   [] body mechanics   [] transfers   [] heat/ice application    [] other:      Other Objective/Functional Measures: exercises initiated per flowsheet for first follow-up.      Pain Level (0-10 scale) post treatment: 0    ASSESSMENT/Changes in Function: Educated patient to monitor posture throughout the day and begin incorporating HEP in increments during his routine. Discussed that in order to improve posture over time, it needs to be reinforced throughout the day. Pt has pain with dowel exercises but does well with prone activities. Patient will continue to benefit from skilled PT services to modify and progress therapeutic interventions, address functional mobility deficits, address ROM deficits, address strength deficits, analyze and address soft tissue restrictions, analyze and cue movement patterns, analyze and modify body mechanics/ergonomics, assess and modify postural abnormalities, and instruct in home and community integration to attain remaining goals. []  See Plan of Care  []  See progress note/recertification  []  See Discharge Summary         Progress towards goals / Updated goals:  Short Term Goals: To be accomplished in 3 weeks:               1. Patient will be independent and compliant with HEP 1-2x/day to increase ease with ADLs safely. Eval: HEP established   Current: not daily (2/2/2023)               2. Patient will improve left shoulder abduction AROM to 150deg without pain to increase ease with overhead acitivities. Eval: Left shoulder AROM abduction: 137deg with report of neck pain. Long Term Goals: To be accomplished in 6 weeks:               1. Patient will improve FOTO to at least 74 to demonstrate improved function. Eval; FOTO: 63   2. Patient will improve cervical AROM rotation (B) to 70deg to increase ease with driving. Eval: Cervical AROM rotation: right: 57deg, left: 33deg               3. Patient will be able to perform cervical flexion AROM to [facility] with no more then 1/10 pain level to increase ease with reading.                Eval: Cervical AROM flexion: 33deg (3/10 pain level)      PLAN  []  Upgrade activities as tolerated     [x]  Continue plan of care  []  Update interventions per flow sheet       []  Discharge due to:_  []  Other:_      Gerald Bird, PT 2/2/2023  8:07 AM    Future Appointments   Date Time Provider Xin Guthrie   2/9/2023  7:30 AM [place] Other, PT [facility]   2/16/2023  7:30 AM [place] Other, PT [facility]   2/23/2023  7:30 AM [place] Other, PT [facility]   3/2/2023  7:30 AM Doretha Nguyen, PTA [facility]   3/9/2023  7:30 AM Laxmi Bhakta, PT [facility]

## 2023-02-04 DIAGNOSIS — Z00.00 PHYSICAL EXAM: Primary | ICD-10-CM

## 2023-02-05 DIAGNOSIS — Z00.00 PHYSICAL EXAM: Primary | ICD-10-CM

## 2023-02-09 ENCOUNTER — APPOINTMENT (OUTPATIENT)
Dept: PHYSICAL THERAPY | Age: 48
End: 2023-02-09
Payer: COMMERCIAL

## 2023-02-16 ENCOUNTER — HOSPITAL ENCOUNTER (OUTPATIENT)
Facility: HOSPITAL | Age: 48
Setting detail: RECURRING SERIES
Discharge: HOME | End: 2023-02-19
Payer: COMMERCIAL

## 2023-02-16 ENCOUNTER — APPOINTMENT (OUTPATIENT)
Dept: PHYSICAL THERAPY | Age: 48
End: 2023-02-16
Payer: COMMERCIAL

## 2023-02-16 PROCEDURE — 97112 NEUROMUSCULAR REEDUCATION: CPT

## 2023-02-16 PROCEDURE — 97110 THERAPEUTIC EXERCISES: CPT

## 2023-02-16 NOTE — PROGRESS NOTES
[facility]  [address]  [place], [address]  [phone] fax    Physical Therapy Discharge Summary  Patient name: Lashonda Hernandez Start of Care: 2023   Referral source: Herson Lomeli MD : 1975   Medical/Treatment Diagnosis: Cervicalgia [M54.2]  Payor: Carlo Saldana / Plan: Jose De Jesus / Product Type: *No Product type* /  Onset Date:23                  Prior Hospitalization: see medical history Provider#: [id number]   Medications: Verified on Patient Summary List    Comorbidities: Allergies, Anxiety or panic disorders, Sleep dysfunction    Prior Level of Function: Patient has had intermittent issues with cervical pain in the past. Patient works as a principal.     Visits from Start of Care: 3    Missed Visits: 1    Reporting Period : 2023 to 23    Goals/Measure of Progress:  Short Term Goals:                1. Patient will be independent and compliant with HEP 1-2x/day to increase ease with ADLs safely. Eval: HEP established              DC: not daily                2. Patient will improve left shoulder abduction AROM to 150deg without pain to increase ease with overhead acitivities. Eval: Left shoulder AROM abduction: 137deg with report of neck pain. DC: Met left shoulder AROM 180 deg  Long Term Goals:                1. Patient will improve FOTO to at least 74 to demonstrate improved function. Eval; FOTO: 61               DC: Met FOTO score 87  2. Patient will improve cervical AROM rotation (B) to 70deg to increase ease with driving. Eval: Cervical AROM rotation: right: 57deg, left: 33deg              DC: progressing  AROM c/s rotation left 63 deg, right 65 deg               3. Patient will be able to perform cervical flexion AROM [facility] [facility] with no more then 1/10 pain level to increase ease with reading.                Eval: Cervical AROM flexion: 33deg (3/10 pain level)   DC: Met cervical flexion AROM WNL with no pain    Assessment/ Summary of Care: The pt has shown significant improvements in C/S progress following his first follow up session of therapy. The pt is progressing well towards his C/S rotation goals and has met his left shoulder ABD goal noting no pain with testing. The pt has also met his FOTO goal, indicating improved quality of life. The pt notes no pain in the c/s and is confident in his ability to continue to progress through HEP performance and at the gym. The pt requested discharge this visit and was given an updated HEP to be discharged to manage self care at home. No pain reported post treatment.     RECOMMENDATIONS:  [x]Discontinue therapy: [x]Patient has reached or is progressing toward set goals      []Patient is non-compliant or has abdicated      []Due to lack of appreciable progress towards set goals    Mike Vu, RACHELLE 2/16/2023 8:22 AM

## 2023-02-16 NOTE — PROGRESS NOTES
PHYSICAL / OCCUPATIONAL THERAPY - DAILY TREATMENT NOTE (updated )    Patient Name: Hemalatha Feliciano    Date: 2023    : 1975  Insurance: Payor: Kenny Glass / Plan: GINETTE LIMON[address] / Product Type: *No Product type* /      Patient  verified Yes     Visit #   Current / Total 3 6   Time   In / Out 7:30 8:08   Pain   In / Out 0 0   Subjective Functional Status/Changes: Pt reports his neck is feeling a lot better and he would like to be discharged today. Changes to:  Meds, Allergies, Med Hx, Sx Hx? If yes, update Summary List no       TREATMENT AREA =  Cervicalgia [M54.2]    OBJECTIVE    Therapeutic Procedures: Tx Min Billable or 1:1 Min (if diff from Tx Min) Procedure, Rationale, Specifics   30  26982 Therapeutic Exercise (timed):  increase ROM, strength, coordination, balance, and proprioception to improve patient's ability to progress to PLOF and address remaining functional goals. (see flow sheet as applicable)     Details if applicable:                 8  23190 Neuromuscular Re-Education (timed):  improve balance, coordination, kinesthetic sense, posture, core stability and proprioception to improve patient's ability to develop conscious control of individual muscles and awareness of position of extremities in order to progress to PLOF and address remaining functional goals.  (see flow sheet as applicable)     Details if applicable:            Details if applicable:     45  [facility] Totals Reminder: bill using total billable min of TIMED therapeutic procedures (example: do not include dry needle or estim unattended, both untimed codes, in totals to left)  8-22 min = 1 unit; 23-37 min = 2 units; 38-52 min = 3 units; 53-67 min = 4 units; 68-82 min = 5 units   Total Total     [x]  Patient Education billed concurrently with other procedures   [x] Review HEP    [] Progressed/Changed HEP, detail:    [] Other detail:       Objective Information/Functional Measures/Assessment  The pt has shown significant improvements in C/S progress following his first follow up session of therapy. The pt is progressing well towards his C/S rotation goals and has met his left shoulder ABD goal noting no pain with testing. The pt has also met his FOTO goal, indicating improved quality of life. The pt notes no pain in the c/s and is confident in his ability to continue to progress through HEP performance and at the gym. The pt requested discharge this visit and was given an updated HEP to be discharged to manage self care at home. No pain reported post treatment. Patient will continue to benefit from skilled PT / OT services to modify and progress therapeutic interventions, analyze and address functional mobility deficits, analyze and address ROM deficits, analyze and address strength deficits, analyze and address soft tissue restrictions, analyze and cue for proper movement patterns, and analyze and modify for postural abnormalities to address functional deficits and attain remaining goals. Progress toward goals / Updated goals:  [x]  See Progress Note/Recertification  Short Term Goals: To be accomplished in 3 weeks:               1. Patient will be independent and compliant with HEP 1-2x/day to increase ease with ADLs safely. Eval: HEP established              Current: not daily (2/2/2023)               2. Patient will improve left shoulder abduction AROM to 150deg without pain to increase ease with overhead acitivities. Eval: Left shoulder AROM abduction: 137deg with report of neck pain. Current: Met 2/16/23 left shoulder AROM 180 deg  Long Term Goals: To be accomplished in 6 weeks:               1. Patient will improve FOTO to at least 74 to demonstrate improved function. Eval; FOTO: 63    Current: Met 2/16/23 FOTO score 87  2. Patient will improve cervical AROM rotation (B) to 70deg to increase ease with driving.                Eval: Cervical AROM rotation: right: 57deg, left: 33deg   Current: progressing 2/16/23 AROM c/s rotation left 63 deg, right 65 deg               3. Patient will be able to perform cervical flexion AROM to [facility] with no more then 1/10 pain level to increase ease with reading.                Eval: Cervical AROM flexion: 33deg (3/10 pain level)   Current: Met cervical flexion AROM WNL with no pain      PLAN  no Continue plan of care  []  Upgrade activities as tolerated  [x]  Discharge due to : progressing towards goals  []  Other:    Ludivina Lefort, RACHELLE    2/16/2023    7:39 AM    Future Appointments   Date Time Provider Edison Taylor   2/23/2023  7:30 AM Donte Gabriel, PT [facility]   3/2/2023  7:30 AM Cherylene Booze, PTA [facility]   3/9/2023  7:30 AM Miguel Nair, PT [facility]   11/15/2023  7:55 AM [facility] VISIT [facility]   11/22/2023  8:20 AM Jose Juan Wolf MD [facility]

## 2023-02-23 ENCOUNTER — APPOINTMENT (OUTPATIENT)
Facility: HOSPITAL | Age: 48
End: 2023-02-23
Payer: COMMERCIAL

## 2023-02-23 ENCOUNTER — APPOINTMENT (OUTPATIENT)
Dept: PHYSICAL THERAPY | Age: 48
End: 2023-02-23
Payer: COMMERCIAL

## 2023-03-02 ENCOUNTER — APPOINTMENT (OUTPATIENT)
Dept: PHYSICAL THERAPY | Age: 48
End: 2023-03-02

## 2023-03-09 ENCOUNTER — APPOINTMENT (OUTPATIENT)
Dept: PHYSICAL THERAPY | Age: 48
End: 2023-03-09

## 2023-05-21 DIAGNOSIS — Z00.00 PHYSICAL EXAM: ICD-10-CM

## 2023-07-13 ENCOUNTER — TELEPHONE (OUTPATIENT)
Age: 48
End: 2023-07-13

## 2023-07-13 RX ORDER — EPINEPHRINE 0.3 MG/.3ML
INJECTION SUBCUTANEOUS
Qty: 1 EACH | Refills: 5 | Status: SHIPPED | OUTPATIENT
Start: 2023-07-13

## 2023-07-13 NOTE — TELEPHONE ENCOUNTER
Please send refill to [facility]    EPINEPHrine (EPIPEN) 0.3 mg/0.3 mL injection 2 Each 5 7/8/2022     Sig: INJECT INTO THE MIDDLE OF THE OUTER THIGH AND HOLD FOR 3 SECONDS AS NEEDED FOR SEVERE ALLERGIC REACTION THEN CALL 911 IF USED.

## 2023-11-13 ENCOUNTER — TELEPHONE (OUTPATIENT)
Age: 48
End: 2023-11-13

## 2023-11-13 RX ORDER — OMEPRAZOLE 20 MG/1
20 CAPSULE, DELAYED RELEASE ORAL DAILY
Qty: 90 CAPSULE | Refills: 3 | Status: SHIPPED | OUTPATIENT
Start: 2023-11-13

## 2023-11-15 ENCOUNTER — NURSE ONLY (OUTPATIENT)
Age: 48
End: 2023-11-15

## 2023-11-15 DIAGNOSIS — Z00.00 PHYSICAL EXAM: ICD-10-CM

## 2023-11-16 LAB
ALBUMIN SERPL-MCNC: 4.5 G/DL (ref 4.1–5.1)
ALBUMIN/GLOB SERPL: 1.8 {RATIO} (ref 1.2–2.2)
ALP SERPL-CCNC: 71 IU/L (ref 44–121)
ALT SERPL-CCNC: 18 IU/L (ref 0–44)
AST SERPL-CCNC: 17 IU/L (ref 0–40)
BASOPHILS # BLD AUTO: 0 X10E3/UL (ref 0–0.2)
BASOPHILS NFR BLD AUTO: 1 %
BILIRUB SERPL-MCNC: 0.4 MG/DL (ref 0–1.2)
BUN SERPL-MCNC: 13 MG/DL (ref 6–24)
BUN/CREAT SERPL: 13 (ref 9–20)
CALCIUM SERPL-MCNC: 9.4 MG/DL (ref 8.7–10.2)
CHLORIDE SERPL-SCNC: 100 MMOL/L (ref 96–106)
CHOLEST SERPL-MCNC: 235 MG/DL (ref 100–199)
CO2 SERPL-SCNC: 25 MMOL/L (ref 20–29)
CREAT SERPL-MCNC: 0.97 MG/DL (ref 0.76–1.27)
EGFRCR SERPLBLD CKD-EPI 2021: 98 ML/MIN/1.73
EOSINOPHIL # BLD AUTO: 0.4 X10E3/UL (ref 0–0.4)
EOSINOPHIL NFR BLD AUTO: 9 %
ERYTHROCYTE [DISTWIDTH] IN BLOOD BY AUTOMATED COUNT: 12 % (ref 11.6–15.4)
GLOBULIN SER CALC-MCNC: 2.5 G/DL (ref 1.5–4.5)
GLUCOSE SERPL-MCNC: 96 MG/DL (ref 70–99)
HCT VFR BLD AUTO: 44.7 % (ref 37.5–51)
HDLC SERPL-MCNC: 53 MG/DL
HGB BLD-MCNC: 15.1 G/DL (ref 13–17.7)
IMM GRANULOCYTES # BLD AUTO: 0 X10E3/UL (ref 0–0.1)
IMM GRANULOCYTES NFR BLD AUTO: 0 %
LDLC SERPL CALC-MCNC: 159 MG/DL (ref 0–99)
LYMPHOCYTES # BLD AUTO: 1.4 X10E3/UL (ref 0.7–3.1)
LYMPHOCYTES NFR BLD AUTO: 29 %
MCH RBC QN AUTO: 30.9 PG (ref 26.6–33)
MCHC RBC AUTO-ENTMCNC: 33.8 G/DL (ref 31.5–35.7)
MCV RBC AUTO: 91 FL (ref 79–97)
MONOCYTES # BLD AUTO: 0.6 X10E3/UL (ref 0.1–0.9)
MONOCYTES NFR BLD AUTO: 12 %
NEUTROPHILS # BLD AUTO: 2.3 X10E3/UL (ref 1.4–7)
NEUTROPHILS NFR BLD AUTO: 49 %
PLATELET # BLD AUTO: 217 X10E3/UL (ref 150–450)
POTASSIUM SERPL-SCNC: 4.4 MMOL/L (ref 3.5–5.2)
PROT SERPL-MCNC: 7 G/DL (ref 6–8.5)
RBC # BLD AUTO: 4.89 X10E6/UL (ref 4.14–5.8)
SODIUM SERPL-SCNC: 139 MMOL/L (ref 134–144)
SPECIMEN STATUS REPORT: NORMAL
TRIGL SERPL-MCNC: 127 MG/DL (ref 0–149)
VLDLC SERPL CALC-MCNC: 23 MG/DL (ref 5–40)
WBC # BLD AUTO: 4.8 X10E3/UL (ref 3.4–10.8)

## 2023-11-20 RX ORDER — FLUOXETINE HYDROCHLORIDE 20 MG/1
CAPSULE ORAL
Qty: 90 CAPSULE | Refills: 1 | Status: SHIPPED | OUTPATIENT
Start: 2023-11-20

## 2023-11-27 ENCOUNTER — TELEPHONE (OUTPATIENT)
Age: 48
End: 2023-11-27

## 2023-11-27 NOTE — PROGRESS NOTES
57 y.o. male who presents for RPE    The prozac continues to control his sx     No cardiovascular complaints. Not exercising due to work schedule although he says he's averaging maybe 5k steps/d at the [facility] [address] [facility]. Weight is up, diet is off     11/18/2021 2/28/2022 8/12/2022 11/21/2022 12/4/2023   Vitals        Weight - Scale 162 lb  166 lb  168 lb  173 lb 9.6 oz  182 lb      No gi or gu issues. He is using ppi prn and doing avoidance measures. Has colo done 2 weeks ago w 5yr recall    He was dx'ed with eczema by derm, got some creams    Allergies are flaring but not taking antihistamines at this time    Past Medical History:   Diagnosis Date    Allergic rhinitis     allergy testing in past    Anxiety and depression     Degenerative arthritis of cervical spine 2009    Dr. Rui Olivarez 2009; MRI showed C5-7 disc extrusion/spur complex w mild spinal stenosis    Eczema 2022    [facility]    FHx: colon cancer     GERD with esophagitis 01/2015    Dr Erna Martin EGD    H/O cardiovascular stress test     ETT neg ex time 13min (7/11); stress echo neg (5/14)    H/O echocardiogram 04/06/2012    tds, ef 60-65%, no RWMA,  RVSP 15-20 mmHg.       HSV (herpes simplex virus) infection 2000    Hyperlipidemia     calc 10 yr risk score 2.3% (11/22)    Reactive hypoglycemia     5/14 3 hr GTT 40     Past Surgical History:   Procedure Laterality Date    APPENDECTOMY  1/14    Dr Jose Luis Martin (1/9/15) neg; Dr Ulysses Lopez (11/20/23) neg - 5yr    NEUROLOGICAL SURGERY  8/14    EEG negative Dr Jason Laguerre  2014    tilt table negative Dr Wanda Johnson  07/2020    Dr Devorah Kahn History    Marital status:      Spouse name: Not on file    Number of children: 3    Years of education: Not on file    Highest education level: Not on file   Occupational History    Occupation: 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[employer]   Tobacco Use    Smoking status: Never    Smokeless

## 2023-12-04 ENCOUNTER — OFFICE VISIT (OUTPATIENT)
Age: 48
End: 2023-12-04
Payer: COMMERCIAL

## 2023-12-04 VITALS
DIASTOLIC BLOOD PRESSURE: 85 MMHG | HEART RATE: 65 BPM | TEMPERATURE: 97.8 F | SYSTOLIC BLOOD PRESSURE: 135 MMHG | RESPIRATION RATE: 18 BRPM | HEIGHT: 68 IN | OXYGEN SATURATION: 99 % | BODY MASS INDEX: 27.58 KG/M2 | WEIGHT: 182 LBS

## 2023-12-04 DIAGNOSIS — K21.00 GASTROESOPHAGEAL REFLUX DISEASE WITH ESOPHAGITIS, UNSPECIFIED WHETHER HEMORRHAGE: ICD-10-CM

## 2023-12-04 DIAGNOSIS — E78.5 HYPERLIPIDEMIA, UNSPECIFIED HYPERLIPIDEMIA TYPE: ICD-10-CM

## 2023-12-04 DIAGNOSIS — Z00.00 PHYSICAL EXAM: Primary | ICD-10-CM

## 2023-12-04 PROCEDURE — 99396 PREV VISIT EST AGE 40-64: CPT | Performed by: INTERNAL MEDICINE

## 2023-12-04 SDOH — ECONOMIC STABILITY: FOOD INSECURITY: WITHIN THE PAST 12 MONTHS, THE FOOD YOU BOUGHT JUST DIDN'T LAST AND YOU DIDN'T HAVE MONEY TO GET MORE.: NEVER TRUE

## 2023-12-04 SDOH — ECONOMIC STABILITY: HOUSING INSECURITY
IN THE LAST 12 MONTHS, WAS THERE A TIME WHEN YOU DID NOT HAVE A STEADY PLACE TO SLEEP OR SLEPT IN A SHELTER (INCLUDING NOW)?: NO

## 2023-12-04 SDOH — ECONOMIC STABILITY: FOOD INSECURITY: WITHIN THE PAST 12 MONTHS, YOU WORRIED THAT YOUR FOOD WOULD RUN OUT BEFORE YOU GOT MONEY TO BUY MORE.: NEVER TRUE

## 2023-12-04 SDOH — ECONOMIC STABILITY: INCOME INSECURITY: HOW HARD IS IT FOR YOU TO PAY FOR THE VERY BASICS LIKE FOOD, HOUSING, MEDICAL CARE, AND HEATING?: NOT VERY HARD

## 2023-12-04 ASSESSMENT — PATIENT HEALTH QUESTIONNAIRE - PHQ9
2. FEELING DOWN, DEPRESSED OR HOPELESS: 0
SUM OF ALL RESPONSES TO PHQ QUESTIONS 1-9: 0
SUM OF ALL RESPONSES TO PHQ9 QUESTIONS 1 & 2: 0
1. LITTLE INTEREST OR PLEASURE IN DOING THINGS: 0
SUM OF ALL RESPONSES TO PHQ QUESTIONS 1-9: 0

## 2023-12-04 NOTE — PROGRESS NOTES
Rosina Shoemaker presents today for   Chief Complaint   Patient presents with    Annual Exam                 1. \"Have you been to the ER, urgent care clinic since your last visit? Hospitalized since your last visit? \" no    2. \"Have you seen or consulted any other health care providers outside of the [address] [place] [address] since your last visit? \" no     3. For patients aged 43-73: Has the patient had a colonoscopy / FIT/ Cologuard? Yes - no Care Gap present      If the patient is female:    4. For patients aged 43-66: Has the patient had a mammogram within the past 2 years? NA - based on age or sex      11. For patients aged 21-65: Has the patient had a pap smear?  NA - based on age or sex

## 2024-01-04 ENCOUNTER — TELEPHONE (OUTPATIENT)
Age: 49
End: 2024-01-04

## 2024-01-04 NOTE — TELEPHONE ENCOUNTER
Pt calling stating he has had a cough off and on for the past 2 weeks within the past two days he has been having tightness in his chest with wheezing ,  he states his wife just recently had pneumonia and  is asking to be seen to please advise

## 2024-01-05 NOTE — TELEPHONE ENCOUNTER
Reached out to wife at 11:00am today, stated that patient was at work and she would text to see if he could do the virtual.  Did not hear back from her.

## 2024-01-08 ENCOUNTER — TELEPHONE (OUTPATIENT)
Age: 49
End: 2024-01-08

## 2024-01-08 NOTE — TELEPHONE ENCOUNTER
Patient is req to be seen in office. He has been having a cough, congestion, and tightness in the chest  for about a week. Pt denied VV and wants to come in office.     Please advise

## 2024-01-10 ENCOUNTER — OFFICE VISIT (OUTPATIENT)
Age: 49
End: 2024-01-10
Payer: COMMERCIAL

## 2024-01-10 VITALS
DIASTOLIC BLOOD PRESSURE: 84 MMHG | RESPIRATION RATE: 16 BRPM | HEART RATE: 75 BPM | HEIGHT: 68 IN | TEMPERATURE: 97.5 F | SYSTOLIC BLOOD PRESSURE: 118 MMHG | BODY MASS INDEX: 27.58 KG/M2 | OXYGEN SATURATION: 97 % | WEIGHT: 182 LBS

## 2024-01-10 DIAGNOSIS — J40 BRONCHITIS: ICD-10-CM

## 2024-01-10 DIAGNOSIS — R05.9 COUGH, UNSPECIFIED TYPE: Primary | ICD-10-CM

## 2024-01-10 DIAGNOSIS — J01.10 ACUTE NON-RECURRENT FRONTAL SINUSITIS: ICD-10-CM

## 2024-01-10 DIAGNOSIS — R06.2 WHEEZING: ICD-10-CM

## 2024-01-10 PROCEDURE — 99213 OFFICE O/P EST LOW 20 MIN: CPT | Performed by: INTERNAL MEDICINE

## 2024-01-10 RX ORDER — AZITHROMYCIN 250 MG/1
250 TABLET, FILM COATED ORAL SEE ADMIN INSTRUCTIONS
Qty: 6 TABLET | Refills: 0 | Status: SHIPPED | OUTPATIENT
Start: 2024-01-10 | End: 2024-01-15

## 2024-01-10 RX ORDER — ALBUTEROL SULFATE 90 UG/1
2 AEROSOL, METERED RESPIRATORY (INHALATION) EVERY 6 HOURS PRN
Qty: 18 G | Refills: 5 | Status: SHIPPED | OUTPATIENT
Start: 2024-01-10

## 2024-01-10 RX ORDER — HYDROCODONE POLISTIREX AND CHLORPHENIRAMINE POLISTIREX 10; 8 MG/5ML; MG/5ML
5 SUSPENSION, EXTENDED RELEASE ORAL EVERY 12 HOURS PRN
Qty: 100 ML | Refills: 0 | Status: SHIPPED | OUTPATIENT
Start: 2024-01-10 | End: 2024-01-20

## 2024-01-10 ASSESSMENT — PATIENT HEALTH QUESTIONNAIRE - PHQ9
2. FEELING DOWN, DEPRESSED OR HOPELESS: 0
SUM OF ALL RESPONSES TO PHQ QUESTIONS 1-9: 0
1. LITTLE INTEREST OR PLEASURE IN DOING THINGS: 0
SUM OF ALL RESPONSES TO PHQ9 QUESTIONS 1 & 2: 0
SUM OF ALL RESPONSES TO PHQ QUESTIONS 1-9: 0

## 2024-01-10 NOTE — PROGRESS NOTES
48 y.o. male who presents for evaluation.    His wife and kids came down with confirmed pneumonia just before Herman.  Right around New Year's, he started having URI symptoms, mostly dry cough.  Over the last few days it is developed into more sinus congestion, facial pressure, purulent discharge.  He also has chest congestion and coughing up dark material as well, some chest tightness and wheezing, the cough is incessant.  He has not measured a fever.  No COVID testing done, he has been using Tylenol as needed.    Past Medical History:   Diagnosis Date    Allergic rhinitis     allergy testing in past    Anxiety and depression     Degenerative arthritis of cervical spine 2009    Dr. Rizvi 2009; MRI showed C5-7 disc extrusion/spur complex w mild spinal stenosis    Eczema 2022    [facility]    FHx: colon cancer     GERD with esophagitis 01/2015    Dr Fox EGD    H/O cardiovascular stress test     ETT neg ex time 13min (7/11); stress echo neg (5/14)    H/O echocardiogram 04/06/2012    tds, ef 60-65%, no RWMA,  RVSP 15-20 mmHg.      HSV (herpes simplex virus) infection 2000    Hyperlipidemia     calc 10 yr risk score 2.3% (11/22)    Reactive hypoglycemia     5/14 3 hr GTT 40     Past Surgical History:   Procedure Laterality Date    APPENDECTOMY  1/14    Dr Villareal    COLONOSCOPY      Dr Fox (1/9/15) neg; Dr Estrada (11/20/23) neg - 5yr    NEUROLOGICAL SURGERY  8/14    EEG negative Dr Hall    SC UNLISTED PROCEDURE CARDIAC SURGERY  2014    tilt table negative Dr Timmons    VASECTOMY  07/2020    Dr Gomez     Current Outpatient Medications   Medication Sig    azithromycin (ZITHROMAX) 250 MG tablet Take 1 tablet by mouth See Admin Instructions for 5 days 500mg on day 1 followed by 250mg on days 2 - 5    HYDROcodone-chlorpheniramine (TUSSIONEX) 10-8 MG/5ML SUER Take 5 mLs by mouth every 12 hours as needed (cough) for up to 10 days. Max Daily Amount: 10 mLs    albuterol sulfate HFA

## 2024-01-10 NOTE — PROGRESS NOTES
Julian Bernabe presents today for   Chief Complaint   Patient presents with    Congestion     Over a week; with chest tightness    Cough       1. \"Have you been to the ER, urgent care clinic since your last visit?  Hospitalized since your last visit?\" No    2. \"Have you seen or consulted any other health care providers outside of the [facility] since your last visit?\" No     3. For patients aged 45-75: Has the patient had a colonoscopy / FIT/ Cologuard? Yes - no Care Gap present      If the patient is female:    4. For patients aged 40-74: Has the patient had a mammogram within the past 2 years? NA - based on age or sex      5. For patients aged 21-65: Has the patient had a pap smear? NA - based on age or sex

## 2024-06-04 DIAGNOSIS — Z00.00 PHYSICAL EXAM: ICD-10-CM

## 2024-07-09 RX ORDER — FLUOXETINE HYDROCHLORIDE 20 MG/1
20 CAPSULE ORAL DAILY
Qty: 90 CAPSULE | Refills: 3 | Status: SHIPPED | OUTPATIENT
Start: 2024-07-09

## 2024-11-28 LAB
ALBUMIN SERPL-MCNC: 4.5 G/DL (ref 4.1–5.1)
ALP SERPL-CCNC: 109 IU/L (ref 44–121)
ALT SERPL-CCNC: 17 IU/L (ref 0–44)
AST SERPL-CCNC: 18 IU/L (ref 0–40)
BASOPHILS # BLD AUTO: 0 X10E3/UL (ref 0–0.2)
BASOPHILS NFR BLD AUTO: 1 %
BILIRUB SERPL-MCNC: 0.3 MG/DL (ref 0–1.2)
BUN SERPL-MCNC: 16 MG/DL (ref 6–24)
BUN/CREAT SERPL: 17 (ref 9–20)
CALCIUM SERPL-MCNC: 9.6 MG/DL (ref 8.7–10.2)
CHLORIDE SERPL-SCNC: 103 MMOL/L (ref 96–106)
CHOLEST SERPL-MCNC: 240 MG/DL (ref 100–199)
CO2 SERPL-SCNC: 24 MMOL/L (ref 20–29)
CREAT SERPL-MCNC: 0.94 MG/DL (ref 0.76–1.27)
EGFRCR SERPLBLD CKD-EPI 2021: 101 ML/MIN/1.73
EOSINOPHIL # BLD AUTO: 0.5 X10E3/UL (ref 0–0.4)
EOSINOPHIL NFR BLD AUTO: 10 %
ERYTHROCYTE [DISTWIDTH] IN BLOOD BY AUTOMATED COUNT: 12.3 % (ref 11.6–15.4)
GLOBULIN SER CALC-MCNC: 2.7 G/DL (ref 1.5–4.5)
GLUCOSE SERPL-MCNC: 92 MG/DL (ref 70–99)
HBA1C MFR BLD: 5.7 % (ref 4.8–5.6)
HCT VFR BLD AUTO: 45.3 % (ref 37.5–51)
HDLC SERPL-MCNC: 50 MG/DL
HGB BLD-MCNC: 15 G/DL (ref 13–17.7)
IMM GRANULOCYTES # BLD AUTO: 0 X10E3/UL (ref 0–0.1)
IMM GRANULOCYTES NFR BLD AUTO: 0 %
LDLC SERPL CALC-MCNC: 147 MG/DL (ref 0–99)
LYMPHOCYTES # BLD AUTO: 1.6 X10E3/UL (ref 0.7–3.1)
LYMPHOCYTES NFR BLD AUTO: 33 %
MCH RBC QN AUTO: 30.4 PG (ref 26.6–33)
MCHC RBC AUTO-ENTMCNC: 33.1 G/DL (ref 31.5–35.7)
MCV RBC AUTO: 92 FL (ref 79–97)
MONOCYTES # BLD AUTO: 0.6 X10E3/UL (ref 0.1–0.9)
MONOCYTES NFR BLD AUTO: 13 %
NEUTROPHILS # BLD AUTO: 2.1 X10E3/UL (ref 1.4–7)
NEUTROPHILS NFR BLD AUTO: 43 %
PLATELET # BLD AUTO: 251 X10E3/UL (ref 150–450)
POTASSIUM SERPL-SCNC: 4.6 MMOL/L (ref 3.5–5.2)
PROT SERPL-MCNC: 7.2 G/DL (ref 6–8.5)
RBC # BLD AUTO: 4.94 X10E6/UL (ref 4.14–5.8)
SODIUM SERPL-SCNC: 142 MMOL/L (ref 134–144)
TRIGL SERPL-MCNC: 235 MG/DL (ref 0–149)
VLDLC SERPL CALC-MCNC: 43 MG/DL (ref 5–40)
WBC # BLD AUTO: 4.8 X10E3/UL (ref 3.4–10.8)

## 2024-12-04 ENCOUNTER — OFFICE VISIT (OUTPATIENT)
Facility: CLINIC | Age: 49
End: 2024-12-04
Payer: COMMERCIAL

## 2024-12-04 VITALS
HEART RATE: 72 BPM | BODY MASS INDEX: 27.43 KG/M2 | WEIGHT: 181 LBS | HEIGHT: 68 IN | SYSTOLIC BLOOD PRESSURE: 138 MMHG | DIASTOLIC BLOOD PRESSURE: 83 MMHG | RESPIRATION RATE: 14 BRPM | OXYGEN SATURATION: 99 % | TEMPERATURE: 98.2 F

## 2024-12-04 DIAGNOSIS — Z00.00 PHYSICAL EXAM: Primary | ICD-10-CM

## 2024-12-04 DIAGNOSIS — R73.02 IGT (IMPAIRED GLUCOSE TOLERANCE): ICD-10-CM

## 2024-12-04 DIAGNOSIS — E78.5 HYPERLIPIDEMIA, UNSPECIFIED HYPERLIPIDEMIA TYPE: ICD-10-CM

## 2024-12-04 DIAGNOSIS — F41.9 ANXIETY: ICD-10-CM

## 2024-12-04 DIAGNOSIS — K21.00 GASTROESOPHAGEAL REFLUX DISEASE WITH ESOPHAGITIS, UNSPECIFIED WHETHER HEMORRHAGE: ICD-10-CM

## 2024-12-04 PROCEDURE — 99396 PREV VISIT EST AGE 40-64: CPT | Performed by: INTERNAL MEDICINE

## 2024-12-04 NOTE — PROGRESS NOTES
Julian Bernabe presents today for   Chief Complaint   Patient presents with    Annual Exam       \"Have you been to the ER, urgent care clinic since your last visit?  Hospitalized since your last visit?\"    NO    “Have you seen or consulted any other health care providers outside of [facility] since your last visit?”    Landon Perez           
Estab. %    Neutrophils Absolute 2.1 1.4 - 7.0 x10E3/uL    Lymphocytes Absolute 1.6 0.7 - 3.1 x10E3/uL    Monocytes Absolute 0.6 0.1 - 0.9 x10E3/uL    Eosinophils Absolute 0.5 (H) 0.0 - 0.4 x10E3/uL    Basophils Absolute 0.0 0.0 - 0.2 x10E3/uL    Immature Granulocytes % 0 Not Estab. %    Immature Grans (Abs) 0.0 0.0 - 0.1 x10E3/uL   Comprehensive Metabolic Panel   Result Value Ref Range    Glucose 92 70 - 99 mg/dL    BUN 16 6 - 24 mg/dL    Creatinine 0.94 0.76 - 1.27 mg/dL    Est, Glom Filt Rate 101 >59 mL/min/1.73    BUN/Creatinine Ratio 17 9 - 20    Sodium 142 134 - 144 mmol/L    Potassium 4.6 3.5 - 5.2 mmol/L    Chloride 103 96 - 106 mmol/L    CO2 24 20 - 29 mmol/L    Calcium 9.6 8.7 - 10.2 mg/dL    Total Protein 7.2 6.0 - 8.5 g/dL    Albumin 4.5 4.1 - 5.1 g/dL    Globulin, Total 2.7 1.5 - 4.5 g/dL    Total Bilirubin 0.3 0.0 - 1.2 mg/dL    Alkaline Phosphatase 109 44 - 121 IU/L    AST 18 0 - 40 IU/L    ALT 17 0 - 44 IU/L   Lipid Panel   Result Value Ref Range    Cholesterol, Total 240 (H) 100 - 199 mg/dL    Triglycerides 235 (H) 0 - 149 mg/dL    HDL 50 >39 mg/dL    VLDL Cholesterol Calculated 43 (H) 5 - 40 mg/dL    LDL Cholesterol 147 (H) 0 - 99 mg/dL   Hemoglobin A1C   Result Value Ref Range    Hemoglobin A1C 5.7 (H) 4.8 - 5.6 %     We reviewed the patient's labs from the last several visits to point out trends in the numbers          Patient Active Problem List   Diagnosis    GERD with esophagitis    Hyperlipidemia    Anxiety         Assessment and plan:  1.  GERD.  PPI and avoidance measures  2.  Anxiety.  Continue prozac  3.  FHx colo ca.  [place] 2028  4.  HLP.  Lifestyle and dietary measures, gave mediterranean diet sheet previously; try to inc exercise program  5.  Weight gain.  IF, dec portion, dec processed foods, inc exercise  6.  Elevated bp without dx of HTN.  Lifestyle changes above, DASH diet, 2gm sodium, exercise to include planks, etc.  Follow readings outside and call if persistently elevated  7.

## 2025-05-12 ENCOUNTER — OFFICE VISIT (OUTPATIENT)
Facility: CLINIC | Age: 50
End: 2025-05-12
Payer: COMMERCIAL

## 2025-05-12 VITALS
HEIGHT: 68 IN | BODY MASS INDEX: 26.83 KG/M2 | TEMPERATURE: 98.5 F | WEIGHT: 177 LBS | HEART RATE: 70 BPM | DIASTOLIC BLOOD PRESSURE: 84 MMHG | RESPIRATION RATE: 16 BRPM | SYSTOLIC BLOOD PRESSURE: 133 MMHG | OXYGEN SATURATION: 98 %

## 2025-05-12 DIAGNOSIS — H66.90 ACUTE OTITIS MEDIA, UNSPECIFIED OTITIS MEDIA TYPE: Primary | ICD-10-CM

## 2025-05-12 DIAGNOSIS — K21.00 GASTROESOPHAGEAL REFLUX DISEASE WITH ESOPHAGITIS, UNSPECIFIED WHETHER HEMORRHAGE: ICD-10-CM

## 2025-05-12 DIAGNOSIS — J30.9 ALLERGIC RHINITIS, UNSPECIFIED SEASONALITY, UNSPECIFIED TRIGGER: ICD-10-CM

## 2025-05-12 PROCEDURE — 99214 OFFICE O/P EST MOD 30 MIN: CPT | Performed by: INTERNAL MEDICINE

## 2025-05-12 RX ORDER — PREDNISONE 20 MG/1
20 TABLET ORAL DAILY
Qty: 5 TABLET | Refills: 0 | Status: SHIPPED | OUTPATIENT
Start: 2025-05-12 | End: 2025-05-17

## 2025-05-12 RX ORDER — AZITHROMYCIN 250 MG/1
TABLET, FILM COATED ORAL
Qty: 6 TABLET | Refills: 0 | Status: SHIPPED | OUTPATIENT
Start: 2025-05-12 | End: 2025-05-22

## 2025-05-12 RX ORDER — OMEPRAZOLE 40 MG/1
40 CAPSULE, DELAYED RELEASE ORAL
Qty: 90 CAPSULE | Refills: 1 | Status: SHIPPED | OUTPATIENT
Start: 2025-05-12

## 2025-05-12 ASSESSMENT — PATIENT HEALTH QUESTIONNAIRE - PHQ9
2. FEELING DOWN, DEPRESSED OR HOPELESS: NOT AT ALL
SUM OF ALL RESPONSES TO PHQ QUESTIONS 1-9: 0
1. LITTLE INTEREST OR PLEASURE IN DOING THINGS: NOT AT ALL
SUM OF ALL RESPONSES TO PHQ QUESTIONS 1-9: 0

## 2025-05-13 NOTE — PROGRESS NOTES
Julian CECI Bernabe presents today for   Chief Complaint   Patient presents with    Pharyngitis     10-11 days    Ear Fullness       \"Have you been to the ER, urgent care clinic since your last visit?  Hospitalized since your last visit?\"    NO    “Have you seen or consulted any other health care providers outside of [facility] since your last visit?”    NO           
(PROVENTIL;VENTOLIN;PROAIR) 108 (90 Base) MCG/ACT inhaler Inhale 2 puffs into the lungs every 6 hours as needed for Wheezing (chest tightness) (Patient not taking: Reported on 5/12/2025)    triamcinolone (ARISTOCORT) 0.5 % cream Apply topically 2 times daily (Patient not taking: Reported on 5/12/2025)    valACYclovir (VALTREX) 500 MG tablet Take 1 tablet by mouth 2 times daily (Patient not taking: Reported on 5/12/2025)     No current facility-administered medications for this visit.     Vitals:    05/12/25 1427 05/12/25 1428   BP: (!) 142/92 133/84   BP Site: Left Upper Arm Right Upper Arm   Patient Position: Sitting Sitting   BP Cuff Size: Medium Adult Medium Adult   Pulse: 71 70   Resp: 16    Temp: 98.5 °F (36.9 °C)    TempSrc: Temporal    SpO2: 98%    Weight: 80.3 kg (177 lb)    Height: 1.727 m (5' 8\")    Right tm nl, left TM with afl, whitish effusion noted minimal erythema.  No canal/ext ear pain, mildly inflamed post auricular and cervical LN, no mastoid tenderness.  Op minimal erythema, no plaques, lungs cta, heart showed rrr    Assessment and plan:  1. Allergies.  Pred x5d  2. R/o otitis, pharyngitis.  Empiric zpak given  3. GERD.  Inc prilosec to 40      Above conditions discussed at length and patient vocalized understanding.  All questions answered to patient satisfaction       Diagnosis Orders   1. Acute otitis media, unspecified otitis media type  azithromycin (ZITHROMAX) 250 MG tablet      2. Allergic rhinitis, unspecified seasonality, unspecified trigger  predniSONE (DELTASONE) 20 MG tablet      3. Gastroesophageal reflux disease with esophagitis, unspecified whether hemorrhage  omeprazole (PRILOSEC) 40 MG delayed release capsule

## 2025-06-04 DIAGNOSIS — Z00.00 PHYSICAL EXAM: ICD-10-CM

## 2025-06-16 ENCOUNTER — PATIENT MESSAGE (OUTPATIENT)
Facility: CLINIC | Age: 50
End: 2025-06-16

## 2025-06-16 DIAGNOSIS — F41.9 ANXIETY: Primary | ICD-10-CM

## 2025-06-17 RX ORDER — LORAZEPAM 1 MG/1
1 TABLET ORAL 2 TIMES DAILY PRN
Qty: 30 TABLET | Refills: 0 | Status: SHIPPED | OUTPATIENT
Start: 2025-06-17 | End: 2025-07-17

## 2025-06-30 ENCOUNTER — TRANSCRIBE ORDERS (OUTPATIENT)
Facility: HOSPITAL | Age: 50
End: 2025-06-30

## 2025-06-30 DIAGNOSIS — J33.8 OTHER POLYP OF SINUS: Primary | ICD-10-CM

## 2025-06-30 DIAGNOSIS — R22.1 LOCALIZED SWELLING, MASS OR LUMP OF NECK: Primary | ICD-10-CM

## 2025-07-08 ENCOUNTER — HOSPITAL ENCOUNTER (OUTPATIENT)
Age: 50
Discharge: HOME | End: 2025-07-11
Payer: COMMERCIAL

## 2025-07-08 DIAGNOSIS — R22.1 LOCALIZED SWELLING, MASS OR LUMP OF NECK: ICD-10-CM

## 2025-07-08 DIAGNOSIS — J33.8 OTHER POLYP OF SINUS: ICD-10-CM

## 2025-07-08 PROCEDURE — 70486 CT MAXILLOFACIAL W/O DYE: CPT

## 2025-07-08 PROCEDURE — 70491 CT SOFT TISSUE NECK W/DYE: CPT

## 2025-07-08 PROCEDURE — 6360000004 HC RX CONTRAST MEDICATION: Performed by: PHYSICIAN ASSISTANT

## 2025-07-08 RX ORDER — IOPAMIDOL 612 MG/ML
80 INJECTION, SOLUTION INTRAVASCULAR
Status: COMPLETED | OUTPATIENT
Start: 2025-07-08 | End: 2025-07-08

## 2025-07-08 RX ADMIN — IOPAMIDOL 80 ML: 612 INJECTION, SOLUTION INTRAVENOUS at 14:31

## 2025-07-14 ENCOUNTER — PATIENT MESSAGE (OUTPATIENT)
Facility: CLINIC | Age: 50
End: 2025-07-14

## 2025-07-14 RX ORDER — VALACYCLOVIR HYDROCHLORIDE 500 MG/1
500 TABLET, FILM COATED ORAL 2 TIMES DAILY
Qty: 60 TABLET | Refills: 11 | Status: SHIPPED | OUTPATIENT
Start: 2025-07-14

## 2025-07-29 ENCOUNTER — OFFICE VISIT (OUTPATIENT)
Facility: CLINIC | Age: 50
End: 2025-07-29
Payer: COMMERCIAL

## 2025-07-29 VITALS
WEIGHT: 178 LBS | RESPIRATION RATE: 16 BRPM | OXYGEN SATURATION: 97 % | SYSTOLIC BLOOD PRESSURE: 138 MMHG | BODY MASS INDEX: 26.98 KG/M2 | HEIGHT: 68 IN | HEART RATE: 92 BPM | DIASTOLIC BLOOD PRESSURE: 83 MMHG | TEMPERATURE: 98.2 F

## 2025-07-29 DIAGNOSIS — R35.0 URINARY FREQUENCY: Primary | ICD-10-CM

## 2025-07-29 DIAGNOSIS — T78.40XD ALLERGIC REACTION, SUBSEQUENT ENCOUNTER: ICD-10-CM

## 2025-07-29 DIAGNOSIS — F51.8 HYPNIC JERKS: ICD-10-CM

## 2025-07-29 DIAGNOSIS — F41.9 ANXIETY: ICD-10-CM

## 2025-07-29 PROCEDURE — 99214 OFFICE O/P EST MOD 30 MIN: CPT | Performed by: INTERNAL MEDICINE

## 2025-07-29 RX ORDER — EPINEPHRINE 0.3 MG/.3ML
INJECTION SUBCUTANEOUS
Qty: 1 EACH | Refills: 5 | Status: SHIPPED | OUTPATIENT
Start: 2025-07-29

## 2025-07-29 NOTE — PROGRESS NOTES
49 y.o. male who presents for evaluation.    He wanted to talk about coming off prozac for his depression and anxiety.  He had been having panic attacks years ago, he estimates on it>10 yrs.  Stress levels are manageable    He's been having episodes of feeling startled just as he is falling asleep which wakes him back up?    He is interested in seeing urology for nocturia 1-3x/night, dec strength of stream, sensation of incomplete emptying.  No dysuria, hematuria, freq    Past Medical History:   Diagnosis Date    Allergic rhinitis     allergy testing in past    Anxiety and depression     COVID-19 vaccine dose declined 01/2024    boosters    Degenerative arthritis of cervical spine 2009    Dr. Rizvi 2009; MRI showed C5-7 disc extrusion/spur complex w mild spinal stenosis    Eczema 2022    [facility] derm    ED (erectile dysfunction) 11/2024    FHx: colon cancer     GERD with esophagitis 01/2015    Dr Fox EGD    H/O cardiovascular stress test     ETT neg ex time 13min (7/11); stress echo neg (5/14)    H/O echocardiogram 04/06/2012    tds, ef 60-65%, no RWMA,  RVSP 15-20 mmHg.      HSV (herpes simplex virus) infection 2000    Hyperlipidemia     calc 10 yr risk score 2.3% (11/22); 3.7% (12/24)    Reactive hypoglycemia     5/14 3 hr GTT 40     Past Surgical History:   Procedure Laterality Date    APPENDECTOMY  1/14    Dr Villareal    COLONOSCOPY      Dr Fox (1/9/15) neg; Dr Estrada (11/20/23) neg - 5yr    NEUROLOGICAL SURGERY  8/14    EEG negative Dr Hall    NE UNLISTED PROCEDURE CARDIAC SURGERY  2014    tilt table negative Dr Timmons    VASECTOMY  07/2020    Dr Gomez     Current Outpatient Medications   Medication Sig    EPINEPHrine (EPIPEN) 0.3 MG/0.3ML SOAJ injection INJECT INTO THE MIDDLE OF THE OUTER THIGH AND HOLD FOR 3 SECONDS AS NEEDED FOR SEVERE ALLERGIC REACTION THEN CALL 911 IF USED.    valACYclovir (VALTREX) 500 MG tablet Take 1 tablet by mouth 2 times daily (Patient taking differently: Take 1

## 2025-07-29 NOTE — PROGRESS NOTES
Julian Bernabe presents today for   Chief Complaint   Patient presents with    Medication Review       \"Have you been to the ER, urgent care clinic since your last visit?  Hospitalized since your last visit?\"    NO    “Have you seen or consulted any other health care providers outside of [facility] since your last visit?”    YES - When: approximately 2 months ago.  Where and Why: [facility]: [place].